# Patient Record
Sex: MALE | Race: WHITE | NOT HISPANIC OR LATINO | Employment: OTHER | URBAN - METROPOLITAN AREA
[De-identification: names, ages, dates, MRNs, and addresses within clinical notes are randomized per-mention and may not be internally consistent; named-entity substitution may affect disease eponyms.]

---

## 2019-03-11 ENCOUNTER — HOSPITAL ENCOUNTER (EMERGENCY)
Facility: MEDICAL CENTER | Age: 64
End: 2019-03-11
Payer: OTHER GOVERNMENT

## 2019-03-11 ENCOUNTER — APPOINTMENT (OUTPATIENT)
Dept: RADIOLOGY | Facility: MEDICAL CENTER | Age: 64
End: 2019-03-11
Attending: EMERGENCY MEDICINE
Payer: OTHER GOVERNMENT

## 2019-03-11 VITALS
DIASTOLIC BLOOD PRESSURE: 98 MMHG | RESPIRATION RATE: 18 BRPM | HEIGHT: 71 IN | SYSTOLIC BLOOD PRESSURE: 160 MMHG | BODY MASS INDEX: 36.17 KG/M2 | HEART RATE: 99 BPM | TEMPERATURE: 99.1 F | OXYGEN SATURATION: 94 % | WEIGHT: 258.38 LBS

## 2019-03-11 PROCEDURE — 71045 X-RAY EXAM CHEST 1 VIEW: CPT

## 2019-03-11 PROCEDURE — 302449 STATCHG TRIAGE ONLY (STATISTIC)

## 2019-03-12 ENCOUNTER — APPOINTMENT (OUTPATIENT)
Dept: RADIOLOGY | Facility: MEDICAL CENTER | Age: 64
DRG: 871 | End: 2019-03-12
Attending: EMERGENCY MEDICINE
Payer: MEDICARE

## 2019-03-12 ENCOUNTER — HOSPITAL ENCOUNTER (INPATIENT)
Facility: MEDICAL CENTER | Age: 64
LOS: 1 days | DRG: 871 | End: 2019-03-13
Attending: EMERGENCY MEDICINE | Admitting: INTERNAL MEDICINE
Payer: MEDICARE

## 2019-03-12 DIAGNOSIS — R09.02 HYPOXIA: ICD-10-CM

## 2019-03-12 DIAGNOSIS — G89.29 OTHER CHRONIC PAIN: ICD-10-CM

## 2019-03-12 DIAGNOSIS — F17.200 TOBACCO DEPENDENCE: ICD-10-CM

## 2019-03-12 DIAGNOSIS — J18.9 PNEUMONIA OF BOTH LOWER LOBES DUE TO INFECTIOUS ORGANISM: ICD-10-CM

## 2019-03-12 PROBLEM — I10 HTN (HYPERTENSION): Status: ACTIVE | Noted: 2019-03-12

## 2019-03-12 PROBLEM — J96.01 ACUTE RESPIRATORY FAILURE WITH HYPOXIA (HCC): Status: ACTIVE | Noted: 2019-03-12

## 2019-03-12 PROBLEM — A41.9 SEVERE SEPSIS (HCC): Status: ACTIVE | Noted: 2019-03-12

## 2019-03-12 PROBLEM — Z72.0 TOBACCO USE: Status: ACTIVE | Noted: 2019-03-12

## 2019-03-12 PROBLEM — R65.20 SEVERE SEPSIS (HCC): Status: ACTIVE | Noted: 2019-03-12

## 2019-03-12 LAB
ALBUMIN SERPL BCP-MCNC: 3.8 G/DL (ref 3.2–4.9)
ALBUMIN/GLOB SERPL: 1 G/DL
ALP SERPL-CCNC: 88 U/L (ref 30–99)
ALT SERPL-CCNC: 36 U/L (ref 2–50)
ANION GAP SERPL CALC-SCNC: 9 MMOL/L (ref 0–11.9)
AST SERPL-CCNC: 21 U/L (ref 12–45)
BASOPHILS # BLD AUTO: 0.6 % (ref 0–1.8)
BASOPHILS # BLD: 0.06 K/UL (ref 0–0.12)
BILIRUB SERPL-MCNC: 0.9 MG/DL (ref 0.1–1.5)
BNP SERPL-MCNC: 31 PG/ML (ref 0–100)
BUN SERPL-MCNC: 19 MG/DL (ref 8–22)
CALCIUM SERPL-MCNC: 9.2 MG/DL (ref 8.4–10.2)
CHLORIDE SERPL-SCNC: 104 MMOL/L (ref 96–112)
CO2 SERPL-SCNC: 24 MMOL/L (ref 20–33)
CREAT SERPL-MCNC: 1.08 MG/DL (ref 0.5–1.4)
EKG IMPRESSION: NORMAL
EOSINOPHIL # BLD AUTO: 0.15 K/UL (ref 0–0.51)
EOSINOPHIL NFR BLD: 1.5 % (ref 0–6.9)
ERYTHROCYTE [DISTWIDTH] IN BLOOD BY AUTOMATED COUNT: 42.6 FL (ref 35.9–50)
FLUAV RNA SPEC QL NAA+PROBE: NEGATIVE
FLUBV RNA SPEC QL NAA+PROBE: NEGATIVE
GLOBULIN SER CALC-MCNC: 3.8 G/DL (ref 1.9–3.5)
GLUCOSE SERPL-MCNC: 120 MG/DL (ref 65–99)
HCT VFR BLD AUTO: 45.4 % (ref 42–52)
HGB BLD-MCNC: 15.7 G/DL (ref 14–18)
IMM GRANULOCYTES # BLD AUTO: 0.04 K/UL (ref 0–0.11)
IMM GRANULOCYTES NFR BLD AUTO: 0.4 % (ref 0–0.9)
LACTATE BLD-SCNC: 1.4 MMOL/L (ref 0.5–2)
LACTATE BLD-SCNC: 1.7 MMOL/L (ref 0.5–2)
LACTATE BLD-SCNC: 1.8 MMOL/L (ref 0.5–2)
LACTATE BLD-SCNC: 2 MMOL/L (ref 0.5–2)
LYMPHOCYTES # BLD AUTO: 1.09 K/UL (ref 1–4.8)
LYMPHOCYTES NFR BLD: 10.8 % (ref 22–41)
MAGNESIUM SERPL-MCNC: 1.7 MG/DL (ref 1.5–2.5)
MCH RBC QN AUTO: 31.5 PG (ref 27–33)
MCHC RBC AUTO-ENTMCNC: 34.6 G/DL (ref 33.7–35.3)
MCV RBC AUTO: 91 FL (ref 81.4–97.8)
MONOCYTES # BLD AUTO: 0.58 K/UL (ref 0–0.85)
MONOCYTES NFR BLD AUTO: 5.7 % (ref 0–13.4)
NEUTROPHILS # BLD AUTO: 8.19 K/UL (ref 1.82–7.42)
NEUTROPHILS NFR BLD: 81 % (ref 44–72)
NRBC # BLD AUTO: 0 K/UL
NRBC BLD-RTO: 0 /100 WBC
PLATELET # BLD AUTO: 227 K/UL (ref 164–446)
PMV BLD AUTO: 9.3 FL (ref 9–12.9)
POTASSIUM SERPL-SCNC: 4.3 MMOL/L (ref 3.6–5.5)
PROCALCITONIN SERPL-MCNC: 0.06 NG/ML
PROT SERPL-MCNC: 7.6 G/DL (ref 6–8.2)
RBC # BLD AUTO: 4.99 M/UL (ref 4.7–6.1)
SODIUM SERPL-SCNC: 137 MMOL/L (ref 135–145)
TROPONIN I SERPL-MCNC: <0.02 NG/ML (ref 0–0.04)
WBC # BLD AUTO: 10.1 K/UL (ref 4.8–10.8)

## 2019-03-12 PROCEDURE — 700102 HCHG RX REV CODE 250 W/ 637 OVERRIDE(OP): Performed by: INTERNAL MEDICINE

## 2019-03-12 PROCEDURE — 71275 CT ANGIOGRAPHY CHEST: CPT

## 2019-03-12 PROCEDURE — 99285 EMERGENCY DEPT VISIT HI MDM: CPT

## 2019-03-12 PROCEDURE — A9270 NON-COVERED ITEM OR SERVICE: HCPCS | Performed by: EMERGENCY MEDICINE

## 2019-03-12 PROCEDURE — 96365 THER/PROPH/DIAG IV INF INIT: CPT

## 2019-03-12 PROCEDURE — 83880 ASSAY OF NATRIURETIC PEPTIDE: CPT

## 2019-03-12 PROCEDURE — 96367 TX/PROPH/DG ADDL SEQ IV INF: CPT

## 2019-03-12 PROCEDURE — 80053 COMPREHEN METABOLIC PANEL: CPT

## 2019-03-12 PROCEDURE — 84145 PROCALCITONIN (PCT): CPT

## 2019-03-12 PROCEDURE — 770020 HCHG ROOM/CARE - TELE (206)

## 2019-03-12 PROCEDURE — 700105 HCHG RX REV CODE 258: Performed by: EMERGENCY MEDICINE

## 2019-03-12 PROCEDURE — 94760 N-INVAS EAR/PLS OXIMETRY 1: CPT

## 2019-03-12 PROCEDURE — 36415 COLL VENOUS BLD VENIPUNCTURE: CPT

## 2019-03-12 PROCEDURE — 83605 ASSAY OF LACTIC ACID: CPT | Mod: 91

## 2019-03-12 PROCEDURE — A9270 NON-COVERED ITEM OR SERVICE: HCPCS | Performed by: INTERNAL MEDICINE

## 2019-03-12 PROCEDURE — 700117 HCHG RX CONTRAST REV CODE 255: Performed by: EMERGENCY MEDICINE

## 2019-03-12 PROCEDURE — 71045 X-RAY EXAM CHEST 1 VIEW: CPT

## 2019-03-12 PROCEDURE — 87502 INFLUENZA DNA AMP PROBE: CPT

## 2019-03-12 PROCEDURE — 700111 HCHG RX REV CODE 636 W/ 250 OVERRIDE (IP): Performed by: EMERGENCY MEDICINE

## 2019-03-12 PROCEDURE — 93005 ELECTROCARDIOGRAM TRACING: CPT | Performed by: EMERGENCY MEDICINE

## 2019-03-12 PROCEDURE — 87040 BLOOD CULTURE FOR BACTERIA: CPT

## 2019-03-12 PROCEDURE — 85025 COMPLETE CBC W/AUTO DIFF WBC: CPT

## 2019-03-12 PROCEDURE — 700111 HCHG RX REV CODE 636 W/ 250 OVERRIDE (IP): Performed by: INTERNAL MEDICINE

## 2019-03-12 PROCEDURE — 700102 HCHG RX REV CODE 250 W/ 637 OVERRIDE(OP): Performed by: EMERGENCY MEDICINE

## 2019-03-12 PROCEDURE — 83735 ASSAY OF MAGNESIUM: CPT

## 2019-03-12 PROCEDURE — 700105 HCHG RX REV CODE 258: Performed by: INTERNAL MEDICINE

## 2019-03-12 PROCEDURE — 99223 1ST HOSP IP/OBS HIGH 75: CPT | Mod: AI | Performed by: INTERNAL MEDICINE

## 2019-03-12 PROCEDURE — 84484 ASSAY OF TROPONIN QUANT: CPT

## 2019-03-12 PROCEDURE — 700101 HCHG RX REV CODE 250: Performed by: INTERNAL MEDICINE

## 2019-03-12 RX ORDER — LOSARTAN POTASSIUM 25 MG/1
50 TABLET ORAL DAILY
Status: DISCONTINUED | OUTPATIENT
Start: 2019-03-13 | End: 2019-03-13 | Stop reason: HOSPADM

## 2019-03-12 RX ORDER — BISACODYL 10 MG
10 SUPPOSITORY, RECTAL RECTAL
Status: DISCONTINUED | OUTPATIENT
Start: 2019-03-12 | End: 2019-03-13 | Stop reason: HOSPADM

## 2019-03-12 RX ORDER — LOSARTAN POTASSIUM 50 MG/1
50 TABLET ORAL DAILY
COMMUNITY

## 2019-03-12 RX ORDER — IBUPROFEN 200 MG
800 TABLET ORAL DAILY
COMMUNITY

## 2019-03-12 RX ORDER — ALBUTEROL SULFATE 90 UG/1
2 AEROSOL, METERED RESPIRATORY (INHALATION) EVERY 6 HOURS PRN
Status: ON HOLD | COMMUNITY
End: 2019-03-13

## 2019-03-12 RX ORDER — ALBUTEROL SULFATE 90 UG/1
2 AEROSOL, METERED RESPIRATORY (INHALATION) EVERY 6 HOURS PRN
Status: DISCONTINUED | OUTPATIENT
Start: 2019-03-12 | End: 2019-03-13 | Stop reason: HOSPADM

## 2019-03-12 RX ORDER — HYDROCODONE BITARTRATE AND ACETAMINOPHEN 10; 325 MG/1; MG/1
1 TABLET ORAL ONCE
Status: COMPLETED | OUTPATIENT
Start: 2019-03-12 | End: 2019-03-12

## 2019-03-12 RX ORDER — SODIUM CHLORIDE 9 MG/ML
500 INJECTION, SOLUTION INTRAVENOUS
Status: DISCONTINUED | OUTPATIENT
Start: 2019-03-12 | End: 2019-03-13 | Stop reason: HOSPADM

## 2019-03-12 RX ORDER — GUAIFENESIN 600 MG/1
600 TABLET, EXTENDED RELEASE ORAL EVERY 12 HOURS
Status: DISCONTINUED | OUTPATIENT
Start: 2019-03-12 | End: 2019-03-13 | Stop reason: HOSPADM

## 2019-03-12 RX ORDER — AZITHROMYCIN 250 MG/1
250 TABLET, FILM COATED ORAL EVERY 24 HOURS
Status: DISCONTINUED | OUTPATIENT
Start: 2019-03-13 | End: 2019-03-13 | Stop reason: HOSPADM

## 2019-03-12 RX ORDER — AZITHROMYCIN 250 MG/1
500 TABLET, FILM COATED ORAL ONCE
Status: DISCONTINUED | OUTPATIENT
Start: 2019-03-12 | End: 2019-03-12

## 2019-03-12 RX ORDER — ACETAMINOPHEN 325 MG/1
650 TABLET ORAL EVERY 6 HOURS PRN
Status: DISCONTINUED | OUTPATIENT
Start: 2019-03-12 | End: 2019-03-13 | Stop reason: HOSPADM

## 2019-03-12 RX ORDER — IPRATROPIUM BROMIDE AND ALBUTEROL SULFATE 2.5; .5 MG/3ML; MG/3ML
3 SOLUTION RESPIRATORY (INHALATION)
Status: DISCONTINUED | OUTPATIENT
Start: 2019-03-12 | End: 2019-03-13 | Stop reason: HOSPADM

## 2019-03-12 RX ORDER — SODIUM CHLORIDE 9 MG/ML
INJECTION, SOLUTION INTRAVENOUS CONTINUOUS
Status: DISCONTINUED | OUTPATIENT
Start: 2019-03-12 | End: 2019-03-13 | Stop reason: HOSPADM

## 2019-03-12 RX ORDER — ONDANSETRON 2 MG/ML
4 INJECTION INTRAMUSCULAR; INTRAVENOUS EVERY 4 HOURS PRN
Status: DISCONTINUED | OUTPATIENT
Start: 2019-03-12 | End: 2019-03-13 | Stop reason: HOSPADM

## 2019-03-12 RX ORDER — ERGOCALCIFEROL 1.25 MG/1
50000 CAPSULE ORAL
Status: DISCONTINUED | OUTPATIENT
Start: 2019-03-12 | End: 2019-03-13 | Stop reason: HOSPADM

## 2019-03-12 RX ORDER — ERGOCALCIFEROL 1.25 MG/1
50000 CAPSULE ORAL
COMMUNITY
End: 2019-03-15

## 2019-03-12 RX ORDER — IBUPROFEN 600 MG/1
TABLET ORAL
Status: COMPLETED
Start: 2019-03-12 | End: 2019-03-12

## 2019-03-12 RX ORDER — AMOXICILLIN 250 MG
2 CAPSULE ORAL 2 TIMES DAILY
Status: DISCONTINUED | OUTPATIENT
Start: 2019-03-12 | End: 2019-03-13 | Stop reason: HOSPADM

## 2019-03-12 RX ORDER — IBUPROFEN 200 MG
TABLET ORAL
Status: COMPLETED
Start: 2019-03-12 | End: 2019-03-12

## 2019-03-12 RX ORDER — HYDROCODONE BITARTRATE AND ACETAMINOPHEN 10; 325 MG/1; MG/1
1 TABLET ORAL 4 TIMES DAILY
Status: DISCONTINUED | OUTPATIENT
Start: 2019-03-12 | End: 2019-03-13 | Stop reason: HOSPADM

## 2019-03-12 RX ORDER — ASPIRIN 325 MG
325 TABLET ORAL DAILY
COMMUNITY

## 2019-03-12 RX ORDER — SODIUM CHLORIDE 9 MG/ML
1000 INJECTION, SOLUTION INTRAVENOUS ONCE
Status: COMPLETED | OUTPATIENT
Start: 2019-03-12 | End: 2019-03-12

## 2019-03-12 RX ORDER — ONDANSETRON 4 MG/1
4 TABLET, ORALLY DISINTEGRATING ORAL EVERY 4 HOURS PRN
Status: DISCONTINUED | OUTPATIENT
Start: 2019-03-12 | End: 2019-03-13 | Stop reason: HOSPADM

## 2019-03-12 RX ORDER — ASPIRIN 325 MG
325 TABLET ORAL DAILY
Status: DISCONTINUED | OUTPATIENT
Start: 2019-03-13 | End: 2019-03-13 | Stop reason: HOSPADM

## 2019-03-12 RX ORDER — POLYETHYLENE GLYCOL 3350 17 G/17G
1 POWDER, FOR SOLUTION ORAL
Status: DISCONTINUED | OUTPATIENT
Start: 2019-03-12 | End: 2019-03-13 | Stop reason: HOSPADM

## 2019-03-12 RX ADMIN — AMPICILLIN SODIUM AND SULBACTAM SODIUM 3 G: 2; 1 INJECTION, POWDER, FOR SOLUTION INTRAMUSCULAR; INTRAVENOUS at 16:58

## 2019-03-12 RX ADMIN — IOHEXOL 75 ML: 350 INJECTION, SOLUTION INTRAVENOUS at 08:40

## 2019-03-12 RX ADMIN — HYDROCODONE BITARTRATE AND ACETAMINOPHEN 1 TABLET: 10; 325 TABLET ORAL at 21:14

## 2019-03-12 RX ADMIN — AMPICILLIN SODIUM AND SULBACTAM SODIUM 3 G: 2; 1 INJECTION, POWDER, FOR SOLUTION INTRAMUSCULAR; INTRAVENOUS at 10:01

## 2019-03-12 RX ADMIN — AMPICILLIN SODIUM AND SULBACTAM SODIUM 3 G: 2; 1 INJECTION, POWDER, FOR SOLUTION INTRAMUSCULAR; INTRAVENOUS at 23:02

## 2019-03-12 RX ADMIN — AZITHROMYCIN MONOHYDRATE 500 MG: 500 INJECTION, POWDER, LYOPHILIZED, FOR SOLUTION INTRAVENOUS at 10:41

## 2019-03-12 RX ADMIN — IPRATROPIUM BROMIDE AND ALBUTEROL SULFATE 3 ML: .5; 3 SOLUTION RESPIRATORY (INHALATION) at 21:43

## 2019-03-12 RX ADMIN — SODIUM CHLORIDE 1000 ML: 9 INJECTION, SOLUTION INTRAVENOUS at 10:00

## 2019-03-12 RX ADMIN — ALBUTEROL SULFATE 2 PUFF: 90 AEROSOL, METERED RESPIRATORY (INHALATION) at 21:13

## 2019-03-12 RX ADMIN — HYDROCODONE BITARTRATE AND ACETAMINOPHEN 1 TABLET: 10; 325 TABLET ORAL at 16:56

## 2019-03-12 RX ADMIN — IBUPROFEN 800 MG: 600 TABLET ORAL at 10:10

## 2019-03-12 RX ADMIN — HYDROCODONE BITARTRATE AND ACETAMINOPHEN 1 TABLET: 10; 325 TABLET ORAL at 09:59

## 2019-03-12 RX ADMIN — ENOXAPARIN SODIUM 40 MG: 100 INJECTION SUBCUTANEOUS at 14:39

## 2019-03-12 RX ADMIN — SODIUM CHLORIDE: 9 INJECTION, SOLUTION INTRAVENOUS at 12:28

## 2019-03-12 RX ADMIN — SODIUM CHLORIDE: 9 INJECTION, SOLUTION INTRAVENOUS at 14:40

## 2019-03-12 ASSESSMENT — PATIENT HEALTH QUESTIONNAIRE - PHQ9
2. FEELING DOWN, DEPRESSED, IRRITABLE, OR HOPELESS: NOT AT ALL
SUM OF ALL RESPONSES TO PHQ9 QUESTIONS 1 AND 2: 0
2. FEELING DOWN, DEPRESSED, IRRITABLE, OR HOPELESS: NOT AT ALL
SUM OF ALL RESPONSES TO PHQ9 QUESTIONS 1 AND 2: 0
1. LITTLE INTEREST OR PLEASURE IN DOING THINGS: NOT AT ALL
1. LITTLE INTEREST OR PLEASURE IN DOING THINGS: NOT AT ALL

## 2019-03-12 ASSESSMENT — LIFESTYLE VARIABLES
AVERAGE NUMBER OF DAYS PER WEEK YOU HAVE A DRINK CONTAINING ALCOHOL: 1
EVER_SMOKED: YES
TOTAL SCORE: 0
HAVE PEOPLE ANNOYED YOU BY CRITICIZING YOUR DRINKING: NO
EVER HAD A DRINK FIRST THING IN THE MORNING TO STEADY YOUR NERVES TO GET RID OF A HANGOVER: NO
TOTAL SCORE: 0
HAVE YOU EVER FELT YOU SHOULD CUT DOWN ON YOUR DRINKING: NO
ON A TYPICAL DAY WHEN YOU DRINK ALCOHOL HOW MANY DRINKS DO YOU HAVE: 0
EVER FELT BAD OR GUILTY ABOUT YOUR DRINKING: NO
TOTAL SCORE: 0
HOW MANY TIMES IN THE PAST YEAR HAVE YOU HAD 5 OR MORE DRINKS IN A DAY: 0
CONSUMPTION TOTAL: NEGATIVE
ALCOHOL_USE: YES

## 2019-03-12 ASSESSMENT — ENCOUNTER SYMPTOMS
FOCAL WEAKNESS: 0
CHILLS: 0
HEADACHES: 0
VOMITING: 0
BACK PAIN: 0
PALPITATIONS: 0
HALLUCINATIONS: 0
COUGH: 1
WEAKNESS: 0
HEARTBURN: 0
BLOOD IN STOOL: 0
DEPRESSION: 0
DIARRHEA: 0
PND: 0
SHORTNESS OF BREATH: 1
ABDOMINAL PAIN: 0
DIZZINESS: 0
SORE THROAT: 0
FEVER: 1
MYALGIAS: 0
NAUSEA: 0

## 2019-03-12 ASSESSMENT — COGNITIVE AND FUNCTIONAL STATUS - GENERAL
SUGGESTED CMS G CODE MODIFIER MOBILITY: CH
SUGGESTED CMS G CODE MODIFIER DAILY ACTIVITY: CH
DAILY ACTIVITIY SCORE: 24
MOBILITY SCORE: 24

## 2019-03-12 NOTE — ED PROVIDER NOTES
ED Provider Note    CHIEF COMPLAINT  Chief Complaint   Patient presents with   • Shortness of Breath     pt has had chest cold since thursday night, pt increasingly SOB since last night       HPI  Heri Delgado is a 64 y.o. male who presents to the emergency department through triage for cough and shortness of breath.  Patient with URI symptoms for 5 days, nasal congestion, cough although with increased work of breathing since last night.  Patient states he thought he was feeling better yesterday morning, he was able to walk almost a mile without difficulty and had significantly increased shortness of breath and hacking cough last night.  Patient came to this facility for evaluation, chest x-ray was done but not interpreted or explained to him before he left due to weight.  Patient states he did not sleep last night, cannot lay flat and had persistent shortness of breath which prompted reevaluation.  Describes low-grade fever.  Nasal congestion.  Chest pain with cough only.  No palpitations or syncope.  No abdominal pain or back pain.  No diaphoresis.    History of hypertension, dyslipidemia.  Tobacco dependence.  Denies history for asthma, COPD, emphysema.  Patient has had pneumonia previously, although did not require hospitalization.    REVIEW OF SYSTEMS  See HPI for further details. All other systems are negative.    PAST MEDICAL HISTORY   has a past medical history of Hypercholesterolemia and Hypertension.    SOCIAL HISTORY  Social History     Social History Main Topics   • Smoking status: Current Some Day Smoker   • Smokeless tobacco: Never Used   • Alcohol use No   • Drug use: No   • Sexual activity: Not on file       SURGICAL HISTORY   has a past surgical history that includes other orthopedic surgery.    CURRENT MEDICATIONS  Home Medications     Reviewed by Jomar Bianchi (Pharmacy Tech) on 03/12/19 at 0820  Med List Status: Complete   Medication Last Dose Status   albuterol 108 (90 Base)  "MCG/ACT Aero Soln inhalation aerosol 3/11/2019 Active   aspirin (ASA) 325 MG Tab 3/12/2019 Active   Coenzyme Q10 (CO Q 10 PO) 3/11/2019 Active   hydrocodone/acetaminophen (NORCO)  MG TABS 3/12/2019 Active   ibuprofen (MOTRIN) 200 MG Tab 3/12/2019 Active   KRILL OIL PO 3/11/2019 Active   losartan (COZAAR) 50 MG Tab 3/12/2019 Active   vitamin D, Ergocalciferol, (DRISDOL) 94779 units Cap capsule 3/5/2019 Active                ALLERGIES  No Known Allergies    PHYSICAL EXAM  VITAL SIGNS: /82   Pulse 96   Temp (!) 38.1 °C (100.5 °F) (Oral)   Resp (!) 25   Ht 1.803 m (5' 11\")   Wt 116.3 kg (256 lb 6.3 oz)   SpO2 93%   BMI 35.76 kg/m²   Pulse ox interpretation: I interpret this pulse ox as normal.  Constitutional: Alert in no apparent distress.  Disheveled.  HENT: Normocephalic, atraumatic. Bilateral external ears normal, Nose normal.  Dry mucous membranes.    Eyes: Pupils are equal and reactive, Conjunctiva normal.   Neck: Normal range of motion, Supple.  No meningeal irritation.  Lymphatic: No lymphadenopathy noted.  No cervical or submandibular lymphadenopathy.  Cardiovascular: Mild tachycardia otherwise regular rate and rhythm, no murmurs. Distal pulses intact.  No peripheral edema.  Thorax & Lungs: Diminished bilaterally and diffusely, faint bibasilar rales.  No expiratory wheezes.  Tachypnea and dry cough without other increased work of breathing, clip speech retractions per  Abdomen: Soft, non-distended, non-tender to palpation.  Skin: Warm, Dry  Musculoskeletal: Good range of motion in all major joints.   Neurologic: Alert and oriented x4.  Moves for extremity spontaneously.  Psychiatric: Affect normal, Judgment normal, Mood normal.       DIAGNOSTIC STUDIES / PROCEDURES    LABS  Results for orders placed or performed during the hospital encounter of 03/12/19   CBC w/ Differential   Result Value Ref Range    WBC 10.1 4.8 - 10.8 K/uL    RBC 4.99 4.70 - 6.10 M/uL    Hemoglobin 15.7 14.0 - 18.0 g/dL "    Hematocrit 45.4 42.0 - 52.0 %    MCV 91.0 81.4 - 97.8 fL    MCH 31.5 27.0 - 33.0 pg    MCHC 34.6 33.7 - 35.3 g/dL    RDW 42.6 35.9 - 50.0 fL    Platelet Count 227 164 - 446 K/uL    MPV 9.3 9.0 - 12.9 fL    Neutrophils-Polys 81.00 (H) 44.00 - 72.00 %    Lymphocytes 10.80 (L) 22.00 - 41.00 %    Monocytes 5.70 0.00 - 13.40 %    Eosinophils 1.50 0.00 - 6.90 %    Basophils 0.60 0.00 - 1.80 %    Immature Granulocytes 0.40 0.00 - 0.90 %    Nucleated RBC 0.00 /100 WBC    Neutrophils (Absolute) 8.19 (H) 1.82 - 7.42 K/uL    Lymphs (Absolute) 1.09 1.00 - 4.80 K/uL    Monos (Absolute) 0.58 0.00 - 0.85 K/uL    Eos (Absolute) 0.15 0.00 - 0.51 K/uL    Baso (Absolute) 0.06 0.00 - 0.12 K/uL    Immature Granulocytes (abs) 0.04 0.00 - 0.11 K/uL    NRBC (Absolute) 0.00 K/uL   Complete Metabolic Panel (CMP)   Result Value Ref Range    Sodium 137 135 - 145 mmol/L    Potassium 4.3 3.6 - 5.5 mmol/L    Chloride 104 96 - 112 mmol/L    Co2 24 20 - 33 mmol/L    Anion Gap 9.0 0.0 - 11.9    Glucose 120 (H) 65 - 99 mg/dL    Bun 19 8 - 22 mg/dL    Creatinine 1.08 0.50 - 1.40 mg/dL    Calcium 9.2 8.4 - 10.2 mg/dL    AST(SGOT) 21 12 - 45 U/L    ALT(SGPT) 36 2 - 50 U/L    Alkaline Phosphatase 88 30 - 99 U/L    Total Bilirubin 0.9 0.1 - 1.5 mg/dL    Albumin 3.8 3.2 - 4.9 g/dL    Total Protein 7.6 6.0 - 8.2 g/dL    Globulin 3.8 (H) 1.9 - 3.5 g/dL    A-G Ratio 1.0 g/dL   Btype Natriuretic Peptide   Result Value Ref Range    B Natriuretic Peptide 31 0 - 100 pg/mL   Troponin STAT   Result Value Ref Range    Troponin I <0.02 0.00 - 0.04 ng/mL   Magnesium   Result Value Ref Range    Magnesium 1.7 1.5 - 2.5 mg/dL   LACTIC ACID   Result Value Ref Range    Lactic Acid 2.0 0.5 - 2.0 mmol/L   Influenza By PCR, A/B   Result Value Ref Range    Influenza virus A RNA Negative Negative    Influenza virus B, PCR Negative Negative   ESTIMATED GFR   Result Value Ref Range    GFR If African American >60 >60 mL/min/1.73 m 2    GFR If Non  >60 >60  mL/min/1.73 m 2   EKG   Result Value Ref Range    Report       [facility] Emergency Dept.    Test Date:  2019  Pt Name:    OLGA AZAR            Department: [facility]  MRN:        [id number]                      Room:       -ROOM 6  Gender:     Male                         Technician: ADEBAYO  :        1955                   Requested By:RAJAN BARON  Order #:    [id number]                    Reading MD: RAJAN BARON DO    Measurements  Intervals                                Axis  Rate:       97                           P:          24  PA:         164                          QRS:        -6  QRSD:       88                           T:          28  QT:         360  QTc:        458    Interpretive Statements  SINUS RHYTHM  LATE PRECORDIAL R/S TRANSITION  S1Q3T3  No previous ECG available for comparison    Electronically Signed On 3- 9:24:19 PDT by RAJAN BARON DO         RADIOLOGY  CT-CTA CHEST PULMONARY ARTERY W/ RECONS   Final Result      No CT evidence of pulmonary thromboembolism      Bronchopneumonia favored over aspiration or edema      Mild right hilar adenopathy most likely is reactive      Small hiatal hernia      Hepatic steatosis and left lobe simple cyst      DX-CHEST-PORTABLE (1 VIEW)   Final Result      No acute cardiopulmonary abnormality.          COURSE & MEDICAL DECISION MAKING  Nursing notes and vital signs were reviewed. (See chart for details)  The patients records were reviewed, history was obtained from the patient;     Evaluation for URI symptoms, cough, shortness of breath and hypoxia most consistent with developing pneumonia.  However, with initial presentation and evaluation, bedside x-ray cannot exclude edema or new onset CHF.  X-rays were quite unrevealing although history provides evidence for pneumonia.  CT chest to exclude pulmonary embolism but does describe more significant bilateral consolidation consistent with  bronchopneumonia.  Patient was hypoxic on triage resting comfortably with 3 L supplemental oxygen by nasal cannula with saturations in the low to mid 90s.  No leukocytosis despite mild left shift, no bandemia.  No lactic acidosis.  No electrode derangement.  Troponin and BNP unremarkable.  Influenza negative.  IV fluid bolus for clinical dehydration, mild tachycardia and pneumonia with some improvement in the heart rate.  Antibiotics per protocol for community acquired pneumonia, Augmentin and azithromycin.  No persistent respiratory distress, no indication for positive pressure mechanical ventilation.  No clinical evidence for sepsis.  No wheezing despite chronic tobacco dependence, no indication for albuterol at this time.  Patient is aware of the findings and agreeable to the disposition plan is he will be admitted to the hospital for further evaluation and treatment.    9:23 AM Hospitalist paged.    9:40 AM Dr. Lloyd is aware of the patient agreeable to admission.    FINAL IMPRESSION  (J18.1) Pneumonia of both lower lobes due to infectious organism (HCC)  (R09.02) Hypoxia  (F17.200) Tobacco dependence  (G89.29) Other chronic pain      Electronically signed by: Shannon Arevalo, 3/12/2019 9:23 AM      This dictation was created using voice recognition software. The accuracy of the dictation is limited to the abilities of the software. I expect there may be some errors of grammar and possibly content. The nursing notes were reviewed and certain aspects of this information were incorporated into this note.

## 2019-03-12 NOTE — ED NOTES
Med rec updated and complete  Allergies reviewed  Interviewed pt with wife at bedside with permission from pt.  Pt reports that he takes ADVIL 200MG 4 tablets every day and ASPIRIN 325MG 1 tablet every day.  Pt reports no antibiotics in the last 30 days.

## 2019-03-12 NOTE — PROGRESS NOTES
Patient approached triage and stated that he was feeling better and that he was going to call his primary to get a prescription of steroids and that he would return if he got worse. Patient was educated on risks of leaving without seeing a ERP and given strict return criteria. He verbalized understanding and left in no distress

## 2019-03-12 NOTE — CARE PLAN
Problem: Safety  Goal: Will remain free from falls  Outcome: PROGRESSING AS EXPECTED  Reinforced fall risk precautions. Non-skid socks on feet, call light in reach. Stand by assist to the bathroom.      Problem: Infection  Goal: Will remain free from infection  Outcome: PROGRESSING AS EXPECTED  Good hand and oral hygiene promoted. Afebrile, WBCs WNL. Standard precaution in place. Perform hand washing. Administer ABX as prescribed.    Problem: Respiratory:  Goal: Respiratory status will improve  Outcome: PROGRESSING AS EXPECTED  Assessed respiratory status. Encouraged deep breathing and coughing. Encouraged and assisted with use of Incentive Spirometer. Administer medications as scheduled. Monitor O2 Sat and administer O2 as needed to keep sats >90% . 2L via NC

## 2019-03-12 NOTE — ED NOTES
Medicated for chest wall/body aches. 1st abx and NS infusing.  Wife at BS and call light in reach.

## 2019-03-12 NOTE — ED TRIAGE NOTES
"Chief Complaint   Patient presents with   • Shortness of Breath     pt has had chest cold since thursday night, pt increasingly SOB since last night     /82   Pulse (!) 101   Temp 37.7 °C (99.9 °F) (Oral)   Ht 1.803 m (5' 11\")   Wt 116.3 kg (256 lb 6.3 oz)   SpO2 88%   BMI 35.76 kg/m²     Pt BIB wife for SOB, pt immediatly placed on 2L NC for hypoxia - pt in the low 80s on RA and blue tinged finger tips present, pt has had a cold for past few days.     Pt was in  ED last night for same complaint but had decided to leave due to long wait times.   "

## 2019-03-12 NOTE — ED TRIAGE NOTES
Patient BiB wife for new onset wheezing and SOB 1 hour ago when he laid flat in bed. Patient has had cold like symptoms for the past week. He denies any resp. Hx.

## 2019-03-12 NOTE — PROGRESS NOTES
Pt arrives to unit from ER to unit via [place], accompanied by son. Pt A&Ox4, No c/o pain. Tele monitor applied, vitals taken. History, allergies, and med rec reviewed and admission profile completed.     Pt oriented to unit and plan of care discussed, including IV fluids, antibiotics, tele monitor. Welcome folder provided and discussed. Communication board filled out. Pt instructed on call light usage and encouraged to call for any questions, needs, or concerns and prior to getting out of bed. Fall precautions in place. Pt agrees with the plan and declines any needs at this time. Bed locked and low and bed alarm on.

## 2019-03-12 NOTE — ED NOTES
Report called to DENA Cabrera.  Aware pt is on the way and that I am available for any questions/concerns.  Preparing for transfer via gurney w/ tele/O2. No changes at this time.

## 2019-03-13 ENCOUNTER — APPOINTMENT (OUTPATIENT)
Dept: ADMISSIONS | Facility: MEDICAL CENTER | Age: 64
End: 2019-03-13
Payer: MEDICARE

## 2019-03-13 ENCOUNTER — PATIENT OUTREACH (OUTPATIENT)
Dept: HEALTH INFORMATION MANAGEMENT | Facility: OTHER | Age: 64
End: 2019-03-13

## 2019-03-13 VITALS
HEART RATE: 70 BPM | RESPIRATION RATE: 18 BRPM | SYSTOLIC BLOOD PRESSURE: 138 MMHG | DIASTOLIC BLOOD PRESSURE: 69 MMHG | OXYGEN SATURATION: 94 % | HEIGHT: 71 IN | TEMPERATURE: 97.2 F | WEIGHT: 260.58 LBS | BODY MASS INDEX: 36.48 KG/M2

## 2019-03-13 LAB
ALBUMIN SERPL BCP-MCNC: 3.4 G/DL (ref 3.2–4.9)
ALBUMIN/GLOB SERPL: 1.1 G/DL
ALP SERPL-CCNC: 71 U/L (ref 30–99)
ALT SERPL-CCNC: 25 U/L (ref 2–50)
ANION GAP SERPL CALC-SCNC: 5 MMOL/L (ref 0–11.9)
AST SERPL-CCNC: 14 U/L (ref 12–45)
BASOPHILS # BLD AUTO: 0.4 % (ref 0–1.8)
BASOPHILS # BLD: 0.03 K/UL (ref 0–0.12)
BILIRUB SERPL-MCNC: 0.7 MG/DL (ref 0.1–1.5)
BUN SERPL-MCNC: 22 MG/DL (ref 8–22)
CALCIUM SERPL-MCNC: 8.1 MG/DL (ref 8.4–10.2)
CHLORIDE SERPL-SCNC: 110 MMOL/L (ref 96–112)
CO2 SERPL-SCNC: 22 MMOL/L (ref 20–33)
CREAT SERPL-MCNC: 0.92 MG/DL (ref 0.5–1.4)
EOSINOPHIL # BLD AUTO: 0.21 K/UL (ref 0–0.51)
EOSINOPHIL NFR BLD: 3 % (ref 0–6.9)
ERYTHROCYTE [DISTWIDTH] IN BLOOD BY AUTOMATED COUNT: 43.9 FL (ref 35.9–50)
GLOBULIN SER CALC-MCNC: 3.1 G/DL (ref 1.9–3.5)
GLUCOSE SERPL-MCNC: 104 MG/DL (ref 65–99)
HCT VFR BLD AUTO: 38.7 % (ref 42–52)
HGB BLD-MCNC: 13.5 G/DL (ref 14–18)
IMM GRANULOCYTES # BLD AUTO: 0.02 K/UL (ref 0–0.11)
IMM GRANULOCYTES NFR BLD AUTO: 0.3 % (ref 0–0.9)
LACTATE BLD-SCNC: 1 MMOL/L (ref 0.5–2)
LYMPHOCYTES # BLD AUTO: 1.91 K/UL (ref 1–4.8)
LYMPHOCYTES NFR BLD: 27.1 % (ref 22–41)
MAGNESIUM SERPL-MCNC: 2 MG/DL (ref 1.5–2.5)
MCH RBC QN AUTO: 32.1 PG (ref 27–33)
MCHC RBC AUTO-ENTMCNC: 34.9 G/DL (ref 33.7–35.3)
MCV RBC AUTO: 91.9 FL (ref 81.4–97.8)
MONOCYTES # BLD AUTO: 0.75 K/UL (ref 0–0.85)
MONOCYTES NFR BLD AUTO: 10.6 % (ref 0–13.4)
NEUTROPHILS # BLD AUTO: 4.14 K/UL (ref 1.82–7.42)
NEUTROPHILS NFR BLD: 58.6 % (ref 44–72)
NRBC # BLD AUTO: 0 K/UL
NRBC BLD-RTO: 0 /100 WBC
PLATELET # BLD AUTO: 193 K/UL (ref 164–446)
PMV BLD AUTO: 9.4 FL (ref 9–12.9)
POTASSIUM SERPL-SCNC: 3.7 MMOL/L (ref 3.6–5.5)
PROT SERPL-MCNC: 6.5 G/DL (ref 6–8.2)
RBC # BLD AUTO: 4.21 M/UL (ref 4.7–6.1)
SODIUM SERPL-SCNC: 137 MMOL/L (ref 135–145)
WBC # BLD AUTO: 7.1 K/UL (ref 4.8–10.8)

## 2019-03-13 PROCEDURE — 700101 HCHG RX REV CODE 250: Performed by: INTERNAL MEDICINE

## 2019-03-13 PROCEDURE — 83735 ASSAY OF MAGNESIUM: CPT

## 2019-03-13 PROCEDURE — 700102 HCHG RX REV CODE 250 W/ 637 OVERRIDE(OP): Performed by: HOSPITALIST

## 2019-03-13 PROCEDURE — 94760 N-INVAS EAR/PLS OXIMETRY 1: CPT

## 2019-03-13 PROCEDURE — A9270 NON-COVERED ITEM OR SERVICE: HCPCS | Performed by: INTERNAL MEDICINE

## 2019-03-13 PROCEDURE — 700102 HCHG RX REV CODE 250 W/ 637 OVERRIDE(OP): Performed by: INTERNAL MEDICINE

## 2019-03-13 PROCEDURE — 700111 HCHG RX REV CODE 636 W/ 250 OVERRIDE (IP): Performed by: INTERNAL MEDICINE

## 2019-03-13 PROCEDURE — 80053 COMPREHEN METABOLIC PANEL: CPT

## 2019-03-13 PROCEDURE — 700105 HCHG RX REV CODE 258: Performed by: INTERNAL MEDICINE

## 2019-03-13 PROCEDURE — 83605 ASSAY OF LACTIC ACID: CPT

## 2019-03-13 PROCEDURE — A9270 NON-COVERED ITEM OR SERVICE: HCPCS | Performed by: HOSPITALIST

## 2019-03-13 PROCEDURE — 99239 HOSP IP/OBS DSCHRG MGMT >30: CPT | Performed by: INTERNAL MEDICINE

## 2019-03-13 PROCEDURE — 85025 COMPLETE CBC W/AUTO DIFF WBC: CPT

## 2019-03-13 RX ORDER — GUAIFENESIN 600 MG/1
600 TABLET, EXTENDED RELEASE ORAL EVERY 12 HOURS
Qty: 28 TAB | Refills: 2 | Status: SHIPPED | OUTPATIENT
Start: 2019-03-13 | End: 2019-03-15

## 2019-03-13 RX ORDER — ALBUTEROL SULFATE 90 UG/1
2 AEROSOL, METERED RESPIRATORY (INHALATION) EVERY 6 HOURS PRN
Qty: 8.5 G | Refills: 1 | Status: SHIPPED | OUTPATIENT
Start: 2019-03-13

## 2019-03-13 RX ORDER — AMOXICILLIN AND CLAVULANATE POTASSIUM 875; 125 MG/1; MG/1
1 TABLET, FILM COATED ORAL 2 TIMES DAILY
Qty: 10 TAB | Refills: 0 | Status: SHIPPED | OUTPATIENT
Start: 2019-03-13 | End: 2019-03-18

## 2019-03-13 RX ORDER — AZITHROMYCIN 250 MG/1
250 TABLET, FILM COATED ORAL DAILY
Qty: 4 TAB | Refills: 0 | Status: SHIPPED | OUTPATIENT
Start: 2019-03-13 | End: 2019-03-17

## 2019-03-13 RX ADMIN — LOSARTAN POTASSIUM 50 MG: 25 TABLET ORAL at 05:00

## 2019-03-13 RX ADMIN — IPRATROPIUM BROMIDE AND ALBUTEROL SULFATE 3 ML: .5; 3 SOLUTION RESPIRATORY (INHALATION) at 07:14

## 2019-03-13 RX ADMIN — HYDROCODONE BITARTRATE AND ACETAMINOPHEN 1 TABLET: 10; 325 TABLET ORAL at 09:09

## 2019-03-13 RX ADMIN — GUAIFENESIN 600 MG: 600 TABLET, EXTENDED RELEASE ORAL at 05:00

## 2019-03-13 RX ADMIN — AMPICILLIN SODIUM AND SULBACTAM SODIUM 3 G: 2; 1 INJECTION, POWDER, FOR SOLUTION INTRAMUSCULAR; INTRAVENOUS at 04:59

## 2019-03-13 RX ADMIN — IBUPROFEN 800 MG: 600 TABLET ORAL at 05:00

## 2019-03-13 RX ADMIN — ASPIRIN 325 MG ORAL TABLET 325 MG: 325 PILL ORAL at 05:00

## 2019-03-13 RX ADMIN — AZITHROMYCIN 250 MG: 250 TABLET, FILM COATED ORAL at 05:00

## 2019-03-13 RX ADMIN — ENOXAPARIN SODIUM 40 MG: 100 INJECTION SUBCUTANEOUS at 05:00

## 2019-03-13 NOTE — PROGRESS NOTES
Report given to Lisseth FERNANDES. POC discussed. Pt resting comfortably in bed. Safety precautions in place.

## 2019-03-13 NOTE — FLOWSHEET NOTE
03/13/19 0714   Interdisciplinary Plan of Care-Goals (Indications)   Bronchodilator Indications Strong Subjective / Objective Improvement   Interdisciplinary Plan of Care-Outcomes    Bronchodilator Outcome Patient at Stable Baseline   Education   Education Yes - Pt. / Family has been Instructed in use of Respiratory Medications and Adverse Reactions   RT Assessment of Delivered Medications   Evaluation of Medication Delivery Daily Yes-- Pt /Family has been Instructed in use of Respiratory Medications and Adverse Reactions   Respiratory WDL   Respiratory (WDL) X   Chest Exam   Work Of Breathing / Effort Mild   Respiration 16   Pulse 69   Breath Sounds   Pre/Post Intervention Post Intervention Assessment   RUL Breath Sounds Transmitted Upper Airway Sound   RML Breath Sounds Diminished   RLL Breath Sounds Diminished   SUSANNE Breath Sounds Transmitted Upper Airway Sound   LLL Breath Sounds Fine Crackles   Secretions   Cough Dry   Oximetry   #Pulse Oximetry (Single Determination) Yes   Oxygen   Pulse Oximetry 93 %   O2 (LPM) 0   O2 Daily Delivery Respiratory  Room Air with O2 Available

## 2019-03-13 NOTE — ASSESSMENT & PLAN NOTE
This is severe sepsis with the following associated acute organ dysfunction(s): acute respiratory failure.   Due to community-acquired pneumonia.  Will place on sepsis protocol and trend lactate.  Will place on IV Unasyn and azithromycin  Sepsis protocol  Follow cultures  Procalcitonin  BNP normal

## 2019-03-13 NOTE — ASSESSMENT & PLAN NOTE
Due to [facility] pneumonia.  He has a history of tobacco though states he has not been smoking as heavily for the last 8 months.  -Continue antibiotics  -As needed duo nebs

## 2019-03-13 NOTE — FLOWSHEET NOTE
03/12/19 2144   Events/Summary/Plan   Events/Summary/Plan SVN PRN   Interdisciplinary Plan of Care-Goals (Indications)   Bronchodilator Indications Strong Subjective / Objective Improvement   Interdisciplinary Plan of Care-Outcomes    Bronchodilator Outcome Patient at Stable Baseline   Education   Education Yes - Pt. / Family has been Instructed in use of Respiratory Equipment;Yes - Pt. / Family has been Instructed in use of Respiratory Medications and Adverse Reactions   RT Assessment of Delivered Medications   Evaluation of Medication Delivery Daily Yes-- Pt /Family has been Instructed in use of Respiratory Medications and Adverse Reactions   Respiratory WDL   Respiratory (WDL) X   Chest Exam   Respiration 16   Pulse 76   Breath Sounds   Pre/Post Intervention Post Intervention Assessment   RUL Breath Sounds Transmitted Upper Airway Sound;Expiratory Wheezes   RML Breath Sounds Diminished   RLL Breath Sounds Diminished;Crackles   SUSANNE Breath Sounds Transmitted Upper Airway Sound;Expiratory Wheezes   LLL Breath Sounds Fine Crackles;Diminished   Oximetry   #Pulse Oximetry (Single Determination) Yes   Oxygen   Home O2 Use Prior To Admission? No   Pulse Oximetry 98 %   O2 (LPM) 2   O2 Daily Delivery Respiratory  Nasal Cannula

## 2019-03-13 NOTE — PROGRESS NOTES
Telemetry Shift Summary    Rhythm SR  HR Range 60-70s  Ectopy Occ PVCs  Measurements .16/.10/.36        Normal Values  Rhythm SR  HR Range    Measurements 0.12-0.20 / 0.06-0.10  / 0.30-0.52

## 2019-03-13 NOTE — PROGRESS NOTES
"RT suggested a mucolytic for pt. Dr. Kumar notified. New order received. Pt then refused mucinex stating that his congestion feels like it is \"breaking up\" from cough.  "

## 2019-03-13 NOTE — H&P
Hospital Medicine History & Physical Note    Date of Service  3/12/2019    Primary Care Physician  Arsenio Sarkar M.D.    Consultants  None    Code Status  Full    Chief Complaint  Cough, Fever    History of Presenting Illness  64 y.o. male with a history of tobacco use, hypertension who presented 3/12/2019 with Fever, cough and shortness of breath.    He first began feeling ill with cough about a week ago.  His symptoms persisted throughout the week and he slowly developed shortness of breath was worse with exertion.  He noted that his breathing also seemed to worsen while lying flat on his back and improved when sitting up at a 45 degree angle.  He denies any chest pain or leg swelling.  He has had a slightly productive cough with green to yellow sputum.  Over the last 1-2 days he has developed fever.  Last night, his breathing seemed much worse and he was unable to get comfortable except when sitting at 45 degrees or more.  This brought him to the ER for evaluation.  He has had similar symptoms one time before in 2014 when he also had pneumonia however he was not hospitalized at this time.  He used to smoke heavily and cut down 8 months ago, last cigarette was 2 weeks ago.    ER course: Noted to be febrile and tachycardic.  Chest x-ray did not show an obvious pneumonia however CT scan confirmed bronchopneumonia.    Review of Systems  Review of Systems   Constitutional: Positive for fever and malaise/fatigue. Negative for chills.   HENT: Negative for sore throat.    Respiratory: Positive for cough and shortness of breath.    Cardiovascular: Negative for chest pain, palpitations, leg swelling and PND.   Gastrointestinal: Negative for abdominal pain, blood in stool, diarrhea, heartburn, nausea and vomiting.   Genitourinary: Negative for dysuria and frequency.   Musculoskeletal: Negative for back pain and myalgias.   Neurological: Negative for dizziness, focal weakness, weakness and headaches.    Psychiatric/Behavioral: Negative for depression and hallucinations.   All other systems reviewed and are negative.      Past Medical History   has a past medical history of Hypercholesterolemia and Hypertension.    Surgical History   has a past surgical history that includes other orthopedic surgery.     Family History  denies any family history of heart attack or stroke.    Social History   reports that he has been smoking.  He has never used smokeless tobacco. He reports that he does not drink alcohol or use drugs.    Allergies  No Known Allergies    Medications  Prior to Admission Medications   Prescriptions Last Dose Informant Patient Reported? Taking?   Coenzyme Q10 (CO Q 10 PO) 3/11/2019 at 2000 Patient Yes Yes   Sig: Take 1 Cap by mouth every evening.   KRILL OIL PO 3/11/2019 at 2000 Patient Yes Yes   Sig: Take 1 Cap by mouth every evening.   albuterol 108 (90 Base) MCG/ACT Aero Soln inhalation aerosol 3/11/2019 at 2000 Patient Yes Yes   Sig: Inhale 2 Puffs by mouth every 6 hours as needed for Shortness of Breath.   aspirin (ASA) 325 MG Tab 3/12/2019 at 0000 Patient Yes Yes   Sig: Take 325 mg by mouth every day.   hydrocodone/acetaminophen (NORCO)  MG TABS 3/12/2019 at 0400 Patient Yes No   Sig: Take 1 Tab by mouth 4 times a day.   ibuprofen (MOTRIN) 200 MG Tab 3/12/2019 at 0000 Patient Yes Yes   Sig: Take 800 mg by mouth every day.   losartan (COZAAR) 50 MG Tab 3/12/2019 at 0000 Patient Yes Yes   Sig: Take 50 mg by mouth every day.   vitamin D, Ergocalciferol, (DRISDOL) 12207 units Cap capsule 3/11/2019 at PM Patient Yes Yes   Sig: Take 50,000 Units by mouth every 7 days. On Tue      Facility-Administered Medications: None       Physical Exam  Temp:  [36.4 °C (97.5 °F)-38.1 °C (100.5 °F)] 36.4 °C (97.5 °F)  Pulse:  [] 65  Resp:  [16-26] 16  BP: (127-144)/(60-82) 127/60  SpO2:  [88 %-94 %] 92 %    Physical Exam   Constitutional: He is oriented to person, place, and time. He appears  well-developed and well-nourished. No distress.   HENT:   Head: Normocephalic.   Mouth/Throat: No oropharyngeal exudate.   Eyes: Pupils are equal, round, and reactive to light. Conjunctivae are normal.   Neck: Normal range of motion. No tracheal deviation present.   Cardiovascular: Normal rate and regular rhythm.    No murmur heard.  Pulmonary/Chest: Effort normal. No respiratory distress. He has wheezes. He has rales. He exhibits no tenderness.   Abdominal: Soft. He exhibits no distension. There is no tenderness. There is no guarding.   Central obesity   Musculoskeletal: Normal range of motion. He exhibits no edema or tenderness.   Lymphadenopathy:     He has no cervical adenopathy.   Neurological: He is alert and oriented to person, place, and time. No cranial nerve deficit.   Skin: Skin is warm and dry. No rash noted.   Psychiatric: He has a normal mood and affect. His behavior is normal.   Nursing note and vitals reviewed.      Laboratory:  Recent Labs      03/12/19   0715   WBC  10.1   RBC  4.99   HEMOGLOBIN  15.7   HEMATOCRIT  45.4   MCV  91.0   MCH  31.5   MCHC  34.6   RDW  42.6   PLATELETCT  227   MPV  9.3     Recent Labs      03/12/19   0715   SODIUM  137   POTASSIUM  4.3   CHLORIDE  104   CO2  24   GLUCOSE  120*   BUN  19   CREATININE  1.08   CALCIUM  9.2     Recent Labs      03/12/19   0715   ALTSGPT  36   ASTSGOT  21   ALKPHOSPHAT  88   TBILIRUBIN  0.9   GLUCOSE  120*         Recent Labs      03/12/19   0715   BNPBTYPENAT  31         Recent Labs      03/12/19   0715   TROPONINI  <0.02       Urinalysis:    No results found     Imaging:  CT-CTA CHEST PULMONARY ARTERY W/ RECONS   Final Result      No CT evidence of pulmonary thromboembolism      Bronchopneumonia favored over aspiration or edema      Mild right hilar adenopathy most likely is reactive      Small hiatal hernia      Hepatic steatosis and left lobe simple cyst      DX-CHEST-PORTABLE (1 VIEW)   Final Result      No acute cardiopulmonary  abnormality.            Assessment/Plan:  I anticipate this patient will require at least two midnights for appropriate medical management, necessitating inpatient admission.    * Bilateral pneumonia   Assessment & Plan    He has no history of aspiration or recent hospitalization.  Continue Unasyn and azithromycin     Tobacco use   Assessment & Plan    -counseled for 3 minutes on tobacco cessation [id number]       Pain, chronic   Assessment & Plan    Continue Norco  Okay for Tylenol and Motrin as needed     HTN (hypertension)   Assessment & Plan    Controlled, continue Cozaar     Acute respiratory failure with hypoxia (HCC)   Assessment & Plan    Due to [facility] pneumonia.  He has a history of tobacco though states he has not been smoking as heavily for the last 8 months.  -Continue antibiotics  -As needed duo nebs     Severe sepsis (HCC)   Assessment & Plan    This is severe sepsis with the following associated acute organ dysfunction(s): acute respiratory failure.   Due to community-acquired pneumonia.  Will place on sepsis protocol and trend lactate.  Will place on IV Unasyn and azithromycin  Sepsis protocol  Follow cultures  Procalcitonin  BNP normal         VTE prophylaxis: Lovenox

## 2019-03-13 NOTE — DISCHARGE INSTRUCTIONS
Discharge Instructions    Discharged to home by car with relative. Discharged via walking, hospital escort: Yes.  Special equipment needed: Not Applicable    Be sure to schedule a follow-up appointment with your primary care doctor or any specialists as instructed.     Discharge Plan:   Diet Plan: Discussed  Activity Level: Discussed  Smoking Cessation Offered:  (n/a)  Confirmed Follow up Appointment: Appointment Scheduled  Confirmed Symptoms Management: Discussed  Medication Reconciliation Updated: Yes  Influenza Vaccine Indication: Not indicated: Previously immunized this influenza season and > 8 years of age    I understand that a diet low in cholesterol, fat, and sodium is recommended for good health. Unless I have been given specific instructions below for another diet, I accept this instruction as my diet prescription.   Other diet: Regular - no restrictions    Special Instructions: None    Pneumonitis  Pneumonitis is inflammation of the lungs.   CAUSES   Many things can cause pneumonitis. These can include:   · A bacterial or viral infection. Pneumonitis due to an infection is usually called pneumonia.  · Work-related exposures, including farm and industrial work. Some substances that can cause pneumonitis include asbestos, silica, inhaled acids, or inhaled chlorine gas.    · Repeated exposure to bird feathers, bird feces, or other allergens.    · Medicine such as chemotherapy drugs, certain antibiotics, and some heart medicines.    · Radiation therapy.    · Exposure to mold. A hot tub, sauna, or home humidifier can have mold growing in it, even if it looks clean. The mold can be breathed in through water vapor.  · Breathing (aspirating) stomach contents, food, or liquids into the lungs.    SIGNS AND SYMPTOMS   · Cough.    · Shortness of breath or difficulty breathing.    · Fever.    · Decreased energy.    · Decreased appetite.    DIAGNOSIS   To diagnose pneumonitis, your health care provider will do a  complete history and physical exam. Various tests may be ordered, such as:   · Pulmonary function test.    · Chest X-ray.    · CT scan of the lungs.    · Bronchoscopy.    · Lung biopsy.    TREATMENT   Treatment will depend on the cause of the pneumonitis. If the cause is exposure to a substance, avoiding further exposure to that substance will help reduce your symptoms. Possible medical treatments for pneumonitis include:   · Corticosteroid medicine to help decrease inflammation in the lungs.    · Antibiotic medicine to help fight a bacterial lung infection.    · Oxygen therapy if you are having difficulty breathing.    HOME CARE INSTRUCTIONS   · Avoid exposure to any substance identified as the cause of your pneumonitis.    · If you must continue to work with substances that can cause pneumonitis, wear a mask to protect your lungs.    · Only take over-the-counter or prescription medicine as directed by your health care provider.    · Do not smoke.    · If you use inhalers, keep them with you at all times.    · Follow up with your health care provider as directed.    SEEK IMMEDIATE MEDICAL CARE IF:   · You develop new or increased shortness of breath.    · You develop a blue color (cyanosis) under your fingernails.    · You have a fever.    MAKE SURE YOU:   · Understand these instructions.  · Will watch your condition.  · Will get help right away if you are not doing well or get worse.  This information is not intended to replace advice given to you by your health care provider. Make sure you discuss any questions you have with your health care provider.  Document Released: 06/07/2011 Document Revised: 08/20/2014 Document Reviewed: 06/09/2014  [employer] Interactive Patient Education © 2017 [employer]    · Is patient discharged on Warfarin / Coumadin?   No     Depression / Suicide Risk    As you are discharged from this Critical access hospital facility, it is important to learn how to keep safe from harming  yourself.    Recognize the warning signs:  · Abrupt changes in personality, positive or negative- including increase in energy   · Giving away possessions  · Change in eating patterns- significant weight changes-  positive or negative  · Change in sleeping patterns- unable to sleep or sleeping all the time   · Unwillingness or inability to communicate  · Depression  · Unusual sadness, discouragement and loneliness  · Talk of wanting to die  · Neglect of personal appearance   · Rebelliousness- reckless behavior  · Withdrawal from people/activities they love  · Confusion- inability to concentrate     If you or a loved one observes any of these behaviors or has concerns about self-harm, here's what you can do:  · Talk about it- your feelings and reasons for harming yourself  · Remove any means that you might use to hurt yourself (examples: pills, rope, extension cords, firearm)  · Get professional help from the community (Mental Health, Substance Abuse, psychological counseling)  · Do not be alone:Call your Safe Contact- someone whom you trust who will be there for you.  · Call your local CRISIS HOTLINE 371-9426 or 440-003-5925  · Call your local Children's Mobile Crisis Response Team [place] (683) 075-8189 or www.DailyStrength  · Call the toll free National Suicide Prevention Hotlines   · National Suicide Prevention Lifeline 648-713-XONQ (7765)  · National Hope Line Network 800-SUICIDE (165-1594)

## 2019-03-13 NOTE — PROGRESS NOTES
Assessment complete. Pt reports pain in the right shoulder improved to 3/10 following norco per MAR. Cold pack for comfort. Pt O2 sat 93-94% on RA. Safety precautions in place. Call light in reach.

## 2019-03-13 NOTE — PROGRESS NOTES
· 2 RN skin check complete.   · Devices in place N/A.  · Skin assessed under devices N/A.  · Confirmed pressure ulcers found on N/A  · New potential pressure ulcers noted on N/A.   · Skin intact  The following interventions in place: Up SBA, turns self independently in bed.

## 2019-03-13 NOTE — PROGRESS NOTES
Report received from Deborah FERNANDES. POC discussed. Pt resting comfortably in bed. Safety precautions in place.

## 2019-03-13 NOTE — PROGRESS NOTES
Per pt request, this RN called pre-op testing to cancel appointment today at 0915. Patient given information to reschedule appointment upon discharge.

## 2019-03-14 NOTE — DISCHARGE SUMMARY
Discharge Summary    CHIEF COMPLAINT ON ADMISSION  Chief Complaint   Patient presents with   • Shortness of Breath     pt has had chest cold since thursday night, pt increasingly SOB since last night       Reason for Admission  difficulty breathing     Admission Date  3/12/2019    CODE STATUS  Full    HPI & HOSPITAL COURSE  This is a 64 y.o. male with a history of tobacco use, hypertension and chronic pain here with cough and fever found to have sepsis due to pneumonia, comp gated by acute respiratory failure with hypoxia that was related to sepsis.  He was started on empiric antibiotic therapy and supplemental oxygen.  He was given medications for cough and supportive care with IV fluids.  He made a much more rapid than expected recovery based on his initial presentation and was able to wean from oxygen the following hospital day.  He tolerated his antibiotics and was changed to oral antibiotics.  These were also well-tolerated.  His other vital signs remained stable, his fever resolved.  He was discharged in stable condition to complete his outpatient antibiotic course       Therefore, he is discharged in good and stable condition to home with close outpatient follow-up.    The patient recovered much more quickly than anticipated on admission.    Discharge Date  3/13/2019    FOLLOW UP ITEMS POST DISCHARGE  Follow-up with primary care within 1 week    DISCHARGE DIAGNOSES  Principal Problem:    Bilateral pneumonia POA: Unknown  Active Problems:    Severe sepsis (HCC) POA: Unknown    Acute respiratory failure with hypoxia (HCC) POA: Unknown    HTN (hypertension) POA: Unknown    Pain, chronic POA: Unknown    Tobacco use POA: Unknown      Overview:         Resolved Problems:    * No resolved hospital problems. *      FOLLOW UP  Future Appointments  Date Time Provider Department Center   3/15/2019 11:00 AM PREADMIT  1 [facility] None     Arsenio Sarkar M.D.  [address] [place] [address]  [phone]    Go on  3/27/2019  Please arrive at 11:00am for your appointment. Thank you      MEDICATIONS ON DISCHARGE     Medication List      START taking these medications      Instructions   amoxicillin-clavulanate 875-125 MG Tabs  Commonly known as:  AUGMENTIN   Take 1 Tab by mouth 2 times a day for 5 days.  Dose:  1 Tab     azithromycin 250 MG Tabs  Commonly known as:  ZITHROMAX   Take 1 Tab by mouth every day for 4 days.  Dose:  250 mg     guaiFENesin  MG Tb12  Commonly known as:  MUCINEX   Take 1 Tab by mouth every 12 hours.  Dose:  600 mg        CONTINUE taking these medications      Instructions   albuterol 108 (90 Base) MCG/ACT Aers inhalation aerosol   Inhale 2 Puffs by mouth every 6 hours as needed for Shortness of Breath.  Dose:  2 Puff     aspirin 325 MG Tabs  Commonly known as:  ASA   Take 325 mg by mouth every day.  Dose:  325 mg     CO Q 10 PO   Take 1 Cap by mouth every evening.  Dose:  1 Cap     HYDROcodone/acetaminophen  MG Tabs  Commonly known as:  NORCO   Take 1 Tab by mouth 4 times a day.  Dose:  1 Tab     ibuprofen 200 MG Tabs  Commonly known as:  MOTRIN   Take 800 mg by mouth every day.  Dose:  800 mg     KRILL OIL PO   Take 1 Cap by mouth every evening.  Dose:  1 Cap     losartan 50 MG Tabs  Commonly known as:  COZAAR   Take 50 mg by mouth every day.  Dose:  50 mg     vitamin D (Ergocalciferol) 63182 units Caps capsule  Commonly known as:  DRISDOL   Take 50,000 Units by mouth every 7 days. On Tue  Dose:  27502 Units            Allergies  No Known Allergies    DIET  No orders of the defined types were placed in this encounter.      ACTIVITY  As tolerated.  Weight bearing as tolerated    CONSULTATIONS  None    PROCEDURES  None    LABORATORY  Lab Results   Component Value Date    SODIUM 137 03/13/2019    POTASSIUM 3.7 03/13/2019    CHLORIDE 110 03/13/2019    CO2 22 03/13/2019    GLUCOSE 104 (H) 03/13/2019    BUN 22 03/13/2019    CREATININE 0.92 03/13/2019        Lab Results   Component Value Date     WBC 7.1 03/13/2019    HEMOGLOBIN 13.5 (L) 03/13/2019    HEMATOCRIT 38.7 (L) 03/13/2019    PLATELETCT 193 03/13/2019        Total time of the discharge process exceeds 38 minutes.

## 2019-03-14 NOTE — PROGRESS NOTES
Telemetry note:    Rhythm: sinus rhythm, occasional PVCs and PACs  Rate: 60's      SD - 0.16  QRS - 0.08  QT - 0.42    Denies chest pain/dizziness/sob

## 2019-03-15 ENCOUNTER — APPOINTMENT (OUTPATIENT)
Dept: ADMISSIONS | Facility: MEDICAL CENTER | Age: 64
End: 2019-03-15
Attending: ORTHOPAEDIC SURGERY
Payer: MEDICARE

## 2019-03-15 NOTE — OR NURSING
"Preadmit appointment: \" Preparing for your Procedure information\" sheet given to patient with verbal and written instructions. Patient instructed to continue prescribed medications through the day before surgery, instructed to take the following medications the day of surgery per anesthesia protocol: Albuterol if needed, NORCO if needed. Patient will bring Albuterol inhaler day of procedure.     Message left with Dr. Sanders's answering service re: patient recent hospitalization for pnuemonia.  "

## 2019-03-17 LAB
BACTERIA BLD CULT: NORMAL
BACTERIA BLD CULT: NORMAL
SIGNIFICANT IND 70042: NORMAL
SIGNIFICANT IND 70042: NORMAL
SITE SITE: NORMAL
SITE SITE: NORMAL
SOURCE SOURCE: NORMAL
SOURCE SOURCE: NORMAL

## 2019-04-09 ENCOUNTER — ANESTHESIA (OUTPATIENT)
Dept: SURGERY | Facility: MEDICAL CENTER | Age: 64
End: 2019-04-09
Payer: MEDICARE

## 2019-04-09 ENCOUNTER — HOSPITAL ENCOUNTER (OUTPATIENT)
Facility: MEDICAL CENTER | Age: 64
End: 2019-04-10
Attending: ORTHOPAEDIC SURGERY | Admitting: ORTHOPAEDIC SURGERY
Payer: MEDICARE

## 2019-04-09 ENCOUNTER — ANESTHESIA EVENT (OUTPATIENT)
Dept: SURGERY | Facility: MEDICAL CENTER | Age: 64
End: 2019-04-09
Payer: MEDICARE

## 2019-04-09 PROCEDURE — G0378 HOSPITAL OBSERVATION PER HR: HCPCS

## 2019-04-09 PROCEDURE — 160002 HCHG RECOVERY MINUTES (STAT): Performed by: ORTHOPAEDIC SURGERY

## 2019-04-09 PROCEDURE — 160048 HCHG OR STATISTICAL LEVEL 1-5: Performed by: ORTHOPAEDIC SURGERY

## 2019-04-09 PROCEDURE — A4450 NON-WATERPROOF TAPE: HCPCS | Performed by: ORTHOPAEDIC SURGERY

## 2019-04-09 PROCEDURE — 160035 HCHG PACU - 1ST 60 MINS PHASE I: Performed by: ORTHOPAEDIC SURGERY

## 2019-04-09 PROCEDURE — 700102 HCHG RX REV CODE 250 W/ 637 OVERRIDE(OP): Performed by: ORTHOPAEDIC SURGERY

## 2019-04-09 PROCEDURE — A6222 GAUZE <=16 IN NO W/SAL W/O B: HCPCS | Performed by: ORTHOPAEDIC SURGERY

## 2019-04-09 PROCEDURE — 160046 HCHG PACU - 1ST 60 MINS PHASE II: Performed by: ORTHOPAEDIC SURGERY

## 2019-04-09 PROCEDURE — 160022 HCHG BLOCK: Performed by: ORTHOPAEDIC SURGERY

## 2019-04-09 PROCEDURE — 160025 RECOVERY II MINUTES (STATS): Performed by: ORTHOPAEDIC SURGERY

## 2019-04-09 PROCEDURE — 700101 HCHG RX REV CODE 250: Performed by: ANESTHESIOLOGY

## 2019-04-09 PROCEDURE — 160036 HCHG PACU - EA ADDL 30 MINS PHASE I: Performed by: ORTHOPAEDIC SURGERY

## 2019-04-09 PROCEDURE — 700101 HCHG RX REV CODE 250

## 2019-04-09 PROCEDURE — A9270 NON-COVERED ITEM OR SERVICE: HCPCS | Performed by: ORTHOPAEDIC SURGERY

## 2019-04-09 PROCEDURE — 160009 HCHG ANES TIME/MIN: Performed by: ORTHOPAEDIC SURGERY

## 2019-04-09 PROCEDURE — 501838 HCHG SUTURE GENERAL: Performed by: ORTHOPAEDIC SURGERY

## 2019-04-09 PROCEDURE — 160029 HCHG SURGERY MINUTES - 1ST 30 MINS LEVEL 4: Performed by: ORTHOPAEDIC SURGERY

## 2019-04-09 PROCEDURE — 700111 HCHG RX REV CODE 636 W/ 250 OVERRIDE (IP)

## 2019-04-09 PROCEDURE — C1713 ANCHOR/SCREW BN/BN,TIS/BN: HCPCS | Performed by: ORTHOPAEDIC SURGERY

## 2019-04-09 PROCEDURE — 502581 HCHG PACK, SHOULDER ARTHROSCOPY: Performed by: ORTHOPAEDIC SURGERY

## 2019-04-09 PROCEDURE — 700102 HCHG RX REV CODE 250 W/ 637 OVERRIDE(OP): Performed by: ANESTHESIOLOGY

## 2019-04-09 PROCEDURE — 160047 HCHG PACU  - EA ADDL 30 MINS PHASE II: Performed by: ORTHOPAEDIC SURGERY

## 2019-04-09 PROCEDURE — A9270 NON-COVERED ITEM OR SERVICE: HCPCS | Performed by: ANESTHESIOLOGY

## 2019-04-09 PROCEDURE — 700111 HCHG RX REV CODE 636 W/ 250 OVERRIDE (IP): Performed by: ANESTHESIOLOGY

## 2019-04-09 PROCEDURE — 500423 HCHG DRESSING, ABD COMBINE: Performed by: ORTHOPAEDIC SURGERY

## 2019-04-09 PROCEDURE — 160041 HCHG SURGERY MINUTES - EA ADDL 1 MIN LEVEL 4: Performed by: ORTHOPAEDIC SURGERY

## 2019-04-09 DEVICE — ANCHOR ICONIX 1-#2 FORCEFIBER 1.4MM (5EA/BX): Type: IMPLANTABLE DEVICE | Site: SHOULDER | Status: FUNCTIONAL

## 2019-04-09 DEVICE — ANCHOR KNOTLESS REELX STT 4.5MM (5EA/BX): Type: IMPLANTABLE DEVICE | Site: SHOULDER | Status: FUNCTIONAL

## 2019-04-09 RX ORDER — BACITRACIN 65 UNIT/MG
POWDER (GRAM) MISCELLANEOUS
Status: DISCONTINUED | OUTPATIENT
Start: 2019-04-09 | End: 2019-04-09 | Stop reason: HOSPADM

## 2019-04-09 RX ORDER — SODIUM CHLORIDE, SODIUM LACTATE, POTASSIUM CHLORIDE, CALCIUM CHLORIDE 600; 310; 30; 20 MG/100ML; MG/100ML; MG/100ML; MG/100ML
INJECTION, SOLUTION INTRAVENOUS CONTINUOUS
Status: DISCONTINUED | OUTPATIENT
Start: 2019-04-09 | End: 2019-04-09 | Stop reason: HOSPADM

## 2019-04-09 RX ORDER — ONDANSETRON 2 MG/ML
INJECTION INTRAMUSCULAR; INTRAVENOUS PRN
Status: DISCONTINUED | OUTPATIENT
Start: 2019-04-09 | End: 2019-04-09 | Stop reason: SURG

## 2019-04-09 RX ORDER — LOSARTAN POTASSIUM 25 MG/1
50 TABLET ORAL DAILY
Status: DISCONTINUED | OUTPATIENT
Start: 2019-04-10 | End: 2019-04-10 | Stop reason: HOSPADM

## 2019-04-09 RX ORDER — DEXAMETHASONE SODIUM PHOSPHATE 10 MG/ML
INJECTION, SOLUTION INTRAMUSCULAR; INTRAVENOUS PRN
Status: DISCONTINUED | OUTPATIENT
Start: 2019-04-09 | End: 2019-04-09 | Stop reason: SURG

## 2019-04-09 RX ORDER — ONDANSETRON 2 MG/ML
4 INJECTION INTRAMUSCULAR; INTRAVENOUS
Status: DISCONTINUED | OUTPATIENT
Start: 2019-04-09 | End: 2019-04-09 | Stop reason: HOSPADM

## 2019-04-09 RX ORDER — OXYCODONE HYDROCHLORIDE 10 MG/1
10 TABLET ORAL
Status: DISCONTINUED | OUTPATIENT
Start: 2019-04-09 | End: 2019-04-10 | Stop reason: HOSPADM

## 2019-04-09 RX ORDER — HALOPERIDOL 5 MG/ML
1 INJECTION INTRAMUSCULAR
Status: DISCONTINUED | OUTPATIENT
Start: 2019-04-09 | End: 2019-04-09 | Stop reason: HOSPADM

## 2019-04-09 RX ORDER — ALBUTEROL SULFATE 90 UG/1
2 AEROSOL, METERED RESPIRATORY (INHALATION) EVERY 6 HOURS PRN
Status: DISCONTINUED | OUTPATIENT
Start: 2019-04-09 | End: 2019-04-10 | Stop reason: HOSPADM

## 2019-04-09 RX ORDER — IBUPROFEN 400 MG/1
800 TABLET ORAL 3 TIMES DAILY
Status: DISCONTINUED | OUTPATIENT
Start: 2019-04-09 | End: 2019-04-10 | Stop reason: HOSPADM

## 2019-04-09 RX ORDER — MIDAZOLAM HYDROCHLORIDE 1 MG/ML
1 INJECTION INTRAMUSCULAR; INTRAVENOUS
Status: DISCONTINUED | OUTPATIENT
Start: 2019-04-09 | End: 2019-04-09 | Stop reason: HOSPADM

## 2019-04-09 RX ORDER — SIMVASTATIN 20 MG
20 TABLET ORAL NIGHTLY
COMMUNITY

## 2019-04-09 RX ORDER — HYDRALAZINE HYDROCHLORIDE 20 MG/ML
5 INJECTION INTRAMUSCULAR; INTRAVENOUS
Status: DISCONTINUED | OUTPATIENT
Start: 2019-04-09 | End: 2019-04-09 | Stop reason: HOSPADM

## 2019-04-09 RX ORDER — SODIUM CHLORIDE, SODIUM LACTATE, POTASSIUM CHLORIDE, CALCIUM CHLORIDE 600; 310; 30; 20 MG/100ML; MG/100ML; MG/100ML; MG/100ML
INJECTION, SOLUTION INTRAVENOUS CONTINUOUS
Status: ACTIVE | OUTPATIENT
Start: 2019-04-09 | End: 2019-04-10

## 2019-04-09 RX ORDER — LIDOCAINE HYDROCHLORIDE 10 MG/ML
INJECTION, SOLUTION INFILTRATION; PERINEURAL
Status: COMPLETED
Start: 2019-04-09 | End: 2019-04-09

## 2019-04-09 RX ORDER — OXYCODONE HCL 5 MG/5 ML
5 SOLUTION, ORAL ORAL
Status: DISCONTINUED | OUTPATIENT
Start: 2019-04-09 | End: 2019-04-09 | Stop reason: HOSPADM

## 2019-04-09 RX ORDER — DIPHENHYDRAMINE HYDROCHLORIDE 50 MG/ML
12.5 INJECTION INTRAMUSCULAR; INTRAVENOUS
Status: DISCONTINUED | OUTPATIENT
Start: 2019-04-09 | End: 2019-04-09 | Stop reason: HOSPADM

## 2019-04-09 RX ORDER — OXYCODONE HCL 10 MG/1
10 TABLET, FILM COATED, EXTENDED RELEASE ORAL ONCE
Status: COMPLETED | OUTPATIENT
Start: 2019-04-09 | End: 2019-04-09

## 2019-04-09 RX ORDER — MEPERIDINE HYDROCHLORIDE 25 MG/ML
6.25 INJECTION INTRAMUSCULAR; INTRAVENOUS; SUBCUTANEOUS
Status: DISCONTINUED | OUTPATIENT
Start: 2019-04-09 | End: 2019-04-09 | Stop reason: HOSPADM

## 2019-04-09 RX ORDER — HYDROCODONE BITARTRATE AND ACETAMINOPHEN 10; 325 MG/1; MG/1
1 TABLET ORAL 4 TIMES DAILY
Status: DISCONTINUED | OUTPATIENT
Start: 2019-04-09 | End: 2019-04-10

## 2019-04-09 RX ORDER — IBUPROFEN 400 MG/1
800 TABLET ORAL DAILY
Status: DISCONTINUED | OUTPATIENT
Start: 2019-04-10 | End: 2019-04-09

## 2019-04-09 RX ORDER — CEFAZOLIN SODIUM 1 G/3ML
INJECTION, POWDER, FOR SOLUTION INTRAMUSCULAR; INTRAVENOUS PRN
Status: DISCONTINUED | OUTPATIENT
Start: 2019-04-09 | End: 2019-04-09 | Stop reason: SURG

## 2019-04-09 RX ORDER — CELECOXIB 200 MG/1
400 CAPSULE ORAL ONCE
Status: COMPLETED | OUTPATIENT
Start: 2019-04-09 | End: 2019-04-09

## 2019-04-09 RX ORDER — HYDROMORPHONE HYDROCHLORIDE 2 MG/ML
INJECTION, SOLUTION INTRAMUSCULAR; INTRAVENOUS; SUBCUTANEOUS PRN
Status: DISCONTINUED | OUTPATIENT
Start: 2019-04-09 | End: 2019-04-09 | Stop reason: SURG

## 2019-04-09 RX ORDER — SIMVASTATIN 20 MG
20 TABLET ORAL NIGHTLY
Status: DISCONTINUED | OUTPATIENT
Start: 2019-04-09 | End: 2019-04-10 | Stop reason: HOSPADM

## 2019-04-09 RX ORDER — OXYCODONE HCL 5 MG/5 ML
10 SOLUTION, ORAL ORAL
Status: DISCONTINUED | OUTPATIENT
Start: 2019-04-09 | End: 2019-04-09 | Stop reason: HOSPADM

## 2019-04-09 RX ORDER — ASPIRIN 325 MG
325 TABLET ORAL DAILY
Status: DISCONTINUED | OUTPATIENT
Start: 2019-04-10 | End: 2019-04-10 | Stop reason: HOSPADM

## 2019-04-09 RX ORDER — BUPIVACAINE HYDROCHLORIDE 2.5 MG/ML
INJECTION, SOLUTION EPIDURAL; INFILTRATION; INTRACAUDAL PRN
Status: DISCONTINUED | OUTPATIENT
Start: 2019-04-09 | End: 2019-04-09 | Stop reason: SURG

## 2019-04-09 RX ORDER — DEXAMETHASONE SODIUM PHOSPHATE 4 MG/ML
INJECTION, SOLUTION INTRA-ARTICULAR; INTRALESIONAL; INTRAMUSCULAR; INTRAVENOUS; SOFT TISSUE PRN
Status: DISCONTINUED | OUTPATIENT
Start: 2019-04-09 | End: 2019-04-09 | Stop reason: SURG

## 2019-04-09 RX ORDER — ACETAMINOPHEN 500 MG
1000 TABLET ORAL ONCE
Status: COMPLETED | OUTPATIENT
Start: 2019-04-09 | End: 2019-04-09

## 2019-04-09 RX ORDER — ACETAMINOPHEN 500 MG
1000 TABLET ORAL EVERY 6 HOURS
Status: DISCONTINUED | OUTPATIENT
Start: 2019-04-09 | End: 2019-04-10 | Stop reason: HOSPADM

## 2019-04-09 RX ORDER — SODIUM CHLORIDE, SODIUM GLUCONATE, SODIUM ACETATE, POTASSIUM CHLORIDE AND MAGNESIUM CHLORIDE 526; 502; 368; 37; 30 MG/100ML; MG/100ML; MG/100ML; MG/100ML; MG/100ML
INJECTION, SOLUTION INTRAVENOUS
Status: DISCONTINUED | OUTPATIENT
Start: 2019-04-09 | End: 2019-04-09 | Stop reason: SURG

## 2019-04-09 RX ORDER — EPINEPHRINE 1 MG/ML(1)
AMPUL (ML) INJECTION
Status: DISCONTINUED | OUTPATIENT
Start: 2019-04-09 | End: 2019-04-09 | Stop reason: HOSPADM

## 2019-04-09 RX ORDER — ONDANSETRON 2 MG/ML
4 INJECTION INTRAMUSCULAR; INTRAVENOUS EVERY 4 HOURS PRN
Status: DISCONTINUED | OUTPATIENT
Start: 2019-04-09 | End: 2019-04-10 | Stop reason: HOSPADM

## 2019-04-09 RX ADMIN — IBUPROFEN 800 MG: 400 TABLET ORAL at 19:45

## 2019-04-09 RX ADMIN — ONDANSETRON 4 MG: 2 INJECTION INTRAMUSCULAR; INTRAVENOUS at 13:51

## 2019-04-09 RX ADMIN — HYDROCODONE BITARTRATE AND ACETAMINOPHEN 1 TABLET: 10; 325 TABLET ORAL at 20:04

## 2019-04-09 RX ADMIN — Medication 0.15 MG: at 13:41

## 2019-04-09 RX ADMIN — ACETAMINOPHEN 1000 MG: 500 TABLET, COATED ORAL at 13:32

## 2019-04-09 RX ADMIN — SIMVASTATIN 20 MG: 20 TABLET, FILM COATED ORAL at 20:04

## 2019-04-09 RX ADMIN — SODIUM CHLORIDE, SODIUM GLUCONATE, SODIUM ACETATE, POTASSIUM CHLORIDE AND MAGNESIUM CHLORIDE: 526; 502; 368; 37; 30 INJECTION, SOLUTION INTRAVENOUS at 15:04

## 2019-04-09 RX ADMIN — SUGAMMADEX 200 MG: 100 INJECTION, SOLUTION INTRAVENOUS at 15:50

## 2019-04-09 RX ADMIN — HYDROMORPHONE HYDROCHLORIDE 1 MG: 2 INJECTION, SOLUTION INTRAMUSCULAR; INTRAVENOUS; SUBCUTANEOUS at 13:51

## 2019-04-09 RX ADMIN — CEFAZOLIN 1 G: 1 INJECTION, POWDER, FOR SOLUTION INTRAVENOUS at 13:53

## 2019-04-09 RX ADMIN — LIDOCAINE HYDROCHLORIDE 0.5 ML: 10 INJECTION, SOLUTION INFILTRATION; PERINEURAL at 13:33

## 2019-04-09 RX ADMIN — BUPIVACAINE HYDROCHLORIDE 20 ML: 2.5 INJECTION, SOLUTION EPIDURAL; INFILTRATION; INTRACAUDAL; PERINEURAL at 13:41

## 2019-04-09 RX ADMIN — ROCURONIUM BROMIDE 50 MG: 10 INJECTION INTRAVENOUS at 13:51

## 2019-04-09 RX ADMIN — Medication 0.5 ML: at 13:33

## 2019-04-09 RX ADMIN — MIDAZOLAM HYDROCHLORIDE 2 MG: 1 INJECTION, SOLUTION INTRAMUSCULAR; INTRAVENOUS at 13:40

## 2019-04-09 RX ADMIN — DEXAMETHASONE SODIUM PHOSPHATE 8 MG: 4 INJECTION, SOLUTION INTRAMUSCULAR; INTRAVENOUS at 13:51

## 2019-04-09 RX ADMIN — CELECOXIB 400 MG: 200 CAPSULE ORAL at 13:31

## 2019-04-09 RX ADMIN — CEFAZOLIN 2 G: 1 INJECTION, POWDER, FOR SOLUTION INTRAVENOUS at 13:51

## 2019-04-09 RX ADMIN — PROPOFOL 200 MG: 10 INJECTION, EMULSION INTRAVENOUS at 13:51

## 2019-04-09 RX ADMIN — SODIUM CHLORIDE, SODIUM LACTATE, POTASSIUM CHLORIDE, CALCIUM CHLORIDE: 600; 310; 30; 20 INJECTION, SOLUTION INTRAVENOUS at 13:34

## 2019-04-09 RX ADMIN — SUGAMMADEX 100 MG: 100 INJECTION, SOLUTION INTRAVENOUS at 15:26

## 2019-04-09 RX ADMIN — OXYCODONE HYDROCHLORIDE 10 MG: 10 TABLET, FILM COATED, EXTENDED RELEASE ORAL at 13:32

## 2019-04-09 RX ADMIN — SODIUM CHLORIDE, SODIUM LACTATE, POTASSIUM CHLORIDE, CALCIUM CHLORIDE: 600; 310; 30; 20 INJECTION, SOLUTION INTRAVENOUS at 13:48

## 2019-04-09 RX ADMIN — DEXAMETHASONE SODIUM PHOSPHATE 2 MG: 10 INJECTION, SOLUTION INTRAMUSCULAR; INTRAVENOUS at 13:41

## 2019-04-09 ASSESSMENT — PATIENT HEALTH QUESTIONNAIRE - PHQ9
1. LITTLE INTEREST OR PLEASURE IN DOING THINGS: NOT AT ALL
1. LITTLE INTEREST OR PLEASURE IN DOING THINGS: NOT AT ALL
SUM OF ALL RESPONSES TO PHQ9 QUESTIONS 1 AND 2: 0
SUM OF ALL RESPONSES TO PHQ9 QUESTIONS 1 AND 2: 0
2. FEELING DOWN, DEPRESSED, IRRITABLE, OR HOPELESS: NOT AT ALL
2. FEELING DOWN, DEPRESSED, IRRITABLE, OR HOPELESS: NOT AT ALL

## 2019-04-09 ASSESSMENT — COGNITIVE AND FUNCTIONAL STATUS - GENERAL
STANDING UP FROM CHAIR USING ARMS: A LITTLE
DRESSING REGULAR UPPER BODY CLOTHING: A LITTLE
DRESSING REGULAR LOWER BODY CLOTHING: A LITTLE
EATING MEALS: A LITTLE
MOVING TO AND FROM BED TO CHAIR: A LITTLE
PERSONAL GROOMING: A LITTLE
CLIMB 3 TO 5 STEPS WITH RAILING: A LITTLE
HELP NEEDED FOR BATHING: A LITTLE
MOBILITY SCORE: 19
DAILY ACTIVITIY SCORE: 18
SUGGESTED CMS G CODE MODIFIER MOBILITY: CK
SUGGESTED CMS G CODE MODIFIER DAILY ACTIVITY: CK
TURNING FROM BACK TO SIDE WHILE IN FLAT BAD: A LITTLE
TOILETING: A LITTLE
MOVING FROM LYING ON BACK TO SITTING ON SIDE OF FLAT BED: A LITTLE

## 2019-04-09 ASSESSMENT — LIFESTYLE VARIABLES
EVER_SMOKED: NEVER
ALCOHOL_USE: NO

## 2019-04-09 ASSESSMENT — PAIN SCALES - GENERAL: PAIN_LEVEL: 2

## 2019-04-09 NOTE — OR NURSING
1539 To PACU from OR via alia. Pt sleeping, respirations spontaneous and non-labored. Surgical dressing to left shoulder, immobilizer in place. Pt quickly became very restless, attempting to remove immobilizer, and demanding that oxygen mask be removed. Oxygen mask removed and pt assisted into an upright position. Oxygen saturation began to drop and pt became very anxious reporting that he couldn't breathe. Medication administered by anesthesiologist.  1554 Pt calmer at this time. Fingers to affected extremity are pink and warm, brisk cap refill observed, radial pulse palpable. Pt able to move fingers to affected extremity, reports numbness. Pt has no complaints.  1612 No changes. Pt instructed in use of inspirometer.  1627 No changes.  1700 Pt dressed and up to a recliner chair. Pt still requiring supplemental oxygen to maintain oxygen saturation of at least 90%. Pt favors the idea of admission as opposed to going home with oxygen. Will address with Dr Sanders as soon as she is available. Pt's wife brought into the recovery room.  1730 Pt reassessed by Dr Sanders and Dr Contreras. Pt encouraged to work with inspirometer and ambulate, if need be home oxygen paperwork has been signed or Dr Sanders is willing to admit.   1745 Pt to be transferred to discharge area. Report to DENA Meléndez.

## 2019-04-09 NOTE — ANESTHESIA TIME REPORT
Anesthesia Start and Stop Event Times     Date Time Event    4/9/2019 1348 Anesthesia Start     1541 Anesthesia Stop        Responsible Staff  04/09/19    Name Role Begin End    Marcellus Contreras M.D. Anesth 1348 1541        Preop Diagnosis (Free Text):  Pre-op Diagnosis     M75.42, M75.112        Preop Diagnosis (Codes):  Diagnosis Information     Diagnosis Code(s):         Post op Diagnosis  Impingement syndrome of left shoulder      Premium Reason  A. 3PM - 7AM    Comments:

## 2019-04-09 NOTE — ANESTHESIA POSTPROCEDURE EVALUATION
Patient: Heri Delgado    Procedure Summary     Date:  04/09/19 Room / Location:   OR  / SURGERY [facility]    Anesthesia Start:  1348 Anesthesia Stop:  1541    Procedures:       SHOULDER DECOMPRESSION ARTHROSCOPIC - SUBACROMIAL W/LABRAL DEBRIDEMENT (Left Shoulder)      SHOULDER ARTHROSCOPY W/ ROTATOR CUFF REPAIR - MESSER (Left Shoulder)      TENODESIS, BICEPS (Left Shoulder) Diagnosis:  (M75.42, M75.112)    Surgeon:  Catrachita Sanders M.D. Responsible Provider:  Marcellus Contreras M.D.    Anesthesia Type:  general, peripheral nerve block ASA Status:  2          Final Anesthesia Type: general, peripheral nerve block  Last vitals  BP   Blood Pressure : 155/87    Temp   (!) 35.7 °C (96.3 °F)    Pulse   Pulse: 78   Resp   16    SpO2   96 %      Anesthesia Post Evaluation    Patient location during evaluation: PACU  Patient participation: complete - patient participated  Level of consciousness: awake and alert  Pain score: 2    Airway patency: patent  Anesthetic complications: no  Cardiovascular status: hemodynamically stable  Respiratory status: acceptable  Hydration status: euvolemic  Comments: US Guided Interscalene Brachial Plexus Block   US transducer placed on the neck in oblique plane approximately at the level of C6.  Anterior and Middle Scalene (MSM) muscles identified with nerve trunks identified between the muscles.  Needle inserted lateral to probe and advanced under direct visualization through the MSM into a perineural position.  After negative aspiration, LA injected with ease and visualized surrounding the nerve trunks.    PONV: none           Nurse Pain Score: 5 (NPRS)

## 2019-04-09 NOTE — ANESTHESIA PROCEDURE NOTES
Airway  Date/Time: 4/9/2019 1:52 PM  Performed by: ARNULFO ECHOLS  Authorized by: ARNULFO ECHOLS     Location:  OR  Urgency:  Elective  Indications for Airway Management:  Anesthesia  Spontaneous Ventilation: absent    Sedation Level:  Deep  Preoxygenated: Yes    Patient Position:  Sniffing  Final Airway Type:  Endotracheal airway  Final Endotracheal Airway:  ETT  Cuffed: Yes    Technique Used for Successful ETT Placement:  Direct laryngoscopy  Insertion Site:  Oral  Blade Type:  [place]  Laryngoscope Blade/Videolaryngoscope Blade Size:  3  ETT Size (mm):  8.0  Measured from:  Teeth  ETT to Teeth (cm):  20  Placement Verified by: auscultation and capnometry    Cormack-Lehane Classification:  Grade IIa - partial view of glottis  Number of Attempts at Approach:  1

## 2019-04-09 NOTE — ANESTHESIA PREPROCEDURE EVALUATION
Relevant Problems   (+) Acute respiratory failure with hypoxia (HCC)   (+) Bilateral pneumonia   (+) HTN (hypertension)   (+) Pain, chronic   (+) Severe sepsis (HCC)   (+) Tobacco use   HTN, high chol, ELAN, h/o pneumonia sepsis, smoker    Physical Exam    Airway   Mallampati: II  TM distance: >3 FB  Neck ROM: full       Cardiovascular - normal exam  Rhythm: regular  Rate: normal  (-) murmur     Dental - normal exam      Very poor dentition   Pulmonary - normal exam  Breath sounds clear to auscultation     Abdominal    Neurological - normal exam                 Anesthesia Plan    ASA 2       Plan - general and peripheral nerve block     Peripheral nerve block will be post-op pain control  Airway plan will be ETT        Induction: intravenous    Postoperative Plan: Postoperative administration of opioids is intended.    Pertinent diagnostic labs and testing reviewed    Informed Consent:    Anesthetic plan and risks discussed with patient.    Use of blood products discussed with: patient whom consented to blood products.

## 2019-04-09 NOTE — ANESTHESIA PROCEDURE NOTES
Peripheral Block  Performed by: ARNULFO ECHOLS  Authorized by: ARNULFO ECHOLS     Patient Location:  Pre-op  Start Time:  4/9/2019 1:41 PM  End Time:  4/9/2019 1:44 PM  Reason for Block: at surgeon's request and post-op pain management    patient identified, IV checked, site marked, risks and benefits discussed, surgical consent, monitors and equipment checked, pre-op evaluation and timeout performed    Patient Position:  Sitting  Prep: ChloraPrep    Monitoring:  Continuous pulse ox  Block Region:  Upper Extremity  Upper Extremity - Block Type:  Interscalene  Laterality:  Left  Procedures: ultrasound guided and nerve stimulator  Image captured, interpreted and electronically stored.    Strength:  2 %  Dose:  2 ml  Block Type:  Single-shot  Needle Length:  100mm  Needle Gauge:  21 G  Needle Localization:  Nerve stimulator and ultrasound guidance  Injection Assessment:  Negative aspiration for heme, no paresthesia on injection, incremental injection and local visualized surrounding nerve on ultrasound  Evidence of intravascular injection: No     US Guided Interscalene Brachial Plexus Block   US transducer placed on the neck in oblique plane approximately at the level of C6.  Anterior and Middle Scalene (MSM) muscles identified with nerve trunks identified between the muscles.  Needle inserted lateral to probe and advanced under direct visualization through the MSM into a perineural position.  After negative aspiration, LA injected with ease and visualized surrounding the nerve trunks.

## 2019-04-09 NOTE — OR SURGEON
Immediate Post OP Note    PreOp Diagnosis: LEFT shoulder impingement, RCT    PostOp Diagnosis: SAME with SLAP lesion    Procedure(s): LEFT  SHOULDER DECOMPRESSION ARTHROSCOPIC - SUBACROMIAL W/LABRAL DEBRIDEMENT - Wound Class: Clean  SHOULDER ARTHROSCOPY W/ ROTATOR CUFF REPAIR - MESSER - Wound Class: Clean  TENODESIS, BICEPS - Wound Class: Clean    Surgeon(s):  Catrachita Sanders M.D.  Assistant: Keely Harvey CFA    Anesthesiologist/Type of Anesthesia:  Anesthesiologist: Marcellus Contreras M.D./General    Surgical Staff:  Circulator: Tammy Blue R.N.  Scrub Person: Colleen Hernández    Specimens removed if any:  * No specimens in log *    Estimated Blood Loss: min  Findings: as above      Complications: none    Richard        4/9/2019 3:39 PM Catrachita Sanders M.D.

## 2019-04-09 NOTE — ANESTHESIA QCDR
2019 [facility] Clinical Data Registry (for Quality Improvement)     Postoperative nausea/vomiting risk protocol (Adult = 18 yrs and Pediatric 3-17 yrs)- (430 and 463)  General inhalation anesthetic (NOT TIVA) with PONV risk factors: Yes  Provision of anti-emetic therapy with at least 2 different classes of agents: Yes   Patient DID NOT receive anti-emetic therapy and reason is documented in Medical Record:  N/A    Multimodal Pain Management- (AQI59)  Patient undergoing Elective Surgery (i.e. Outpatient, or ASC, or Prescheduled Surgery prior to Hospital Admission): Yes  Use of Multimodal Pain Management, two or more drugs and/or interventions, NOT including systemic opioids: Yes   Exception: Documented allergy to multiple classes of analgesics:  N/A    PACU assessment of acute postoperative pain prior to Anesthesia Care End- Applies to Patients Age = 18- (ABG7)  Initial PACU pain score is which of the following: < 7/10  Patient unable to report pain score: N/A    Post-anesthetic transfer of care checklist/protocol to PACU/ICU- (426 and 427)  Upon conclusion of case, patient transferred to which of the following locations: PACU/Non-ICU  Use of transfer checklist/protocol: Yes  Exclusion: Service Performed in Patient Hospital Room (and thus did not require transfer): N/A    PACU Reintubation- (AQI31)  General anesthesia requiring endotracheal intubation (ETT) along with subsequent extubation in OR or PACU: Yes  Required reintubation in the PACU: No   Extubation was a planned trial documented in the medical record prior to removal of the original airway device:  N/A    Unplanned admission to ICU related to anesthesia service up through end of PACU care- (MD51)  Unplanned admission to ICU (not initially anticipated at anesthesia start time): No

## 2019-04-09 NOTE — OP REPORT
DATE OF SERVICE:  04/09/2019    PREOPERATIVE DIAGNOSES:  Left shoulder impingement syndrome, rotator cuff   tear.    POSTOPERATIVE DIAGNOSES:  Left shoulder impingement syndrome, rotator cuff   tear, SLAP lesion with proximal biceps tendinopathy.    PROCEDURES PERFORMED:  Left shoulder arthroscopy, subacromial decompression,   superior labral debridement, arthroscopic suprapectoral proximal biceps   tenodesis, arthroscopic rotator cuff repair.    SURGEON:  Catrachita Sanders MD    ASSISTANT:  Keely Harvey CFA    ANESTHESIOLOGIST:  Marcellus Contreras MD    TYPE OF ANESTHESIA:  General, with preoperative interscalene nerve block.    IV FLUID:  1 liter crystalloid.    ESTIMATED BLOOD LOSS:  Minimal.    DRAINS:  None.    SPECIMENS:  None.    COMPLICATIONS:  None.    IMPLANTS:  Richard Iconix anchors x2, ReelX anchor x1.    REASON FOR PROCEDURE:  The patient is a 64-year-old male who sustained an   acute fall and injury to the left shoulder last October.  An MRI demonstrated   a rotator cuff defect.  We decided to proceed with arthroscopy and repair, due   to his ongoing symptoms.    DESCRIPTION OF OPERATION:  The patient was given a left interscalene nerve   block by the anesthesiologist before surgery.  Once back in the operating   room, a breathing tube was placed.  He was given 3 g of IV Ancef.  He was then   placed in the lateral decubitus position.  Care was taken to pad his axilla   as well as all bony prominences.  The left upper extremity was then prepped   with ChloraPrep and draped in a standard sterile fashion.  It was then placed   in the Arthrex traction device.  Bony landmarks were drawn and a standard   posterior portal was established.  The arthroscope was then inserted into the   glenohumeral joint.  An anterosuperior cannula was placed in the rotator   interval, just beneath the biceps tendon.  A probe was inserted.  There was an   unstable SLAP lesion noted with proximal biceps tendinopathy.  The  decision   was made to perform a tenodesis.  The superior labrum was debrided.  The   posterior labrum was debrided.  There was minimal degenerative change noted in   the glenohumeral joint.  The subscapularis tendon was intact.  There was no   obvious rotator cuff tear noted from the articular side.  The arthroscope was   inserted into the subacromial space.  A lateral portal was established.  There   was a copious amount of thickened, inflamed bursal tissue.  This was removed   with the 4.0 aggressive shaver.  Borders of the acromion were defined.  The   5.5 mm resector was used to remove the anterior curve as well as lateral edge.    Portals were switched.  Using a standard cutting block technique from   posterior to anterior, a subacromial decompression was completed.  Attention   was turned to the rotator cuff below.  It was a relatively high-grade   bursal-sided tear.  This appeared to involve the leading edge of the   supraspinatus with mild retraction.  The edges were debrided.  This would be   later repaired after the tenodesis was performed.  The arthroscope was then   inserted into the anterosuperior cannula, aiming down the arm.  The bicipital   groove was identified in the suprapectoral region.  The biceps groove was   opened.  The 4.0 aggressive shaver was used coming in from the lateral portal   as well as an accessory percutaneous suprapectoral portal that was established   to prepare the groove to create a healing response for the tendon tenodesis   site.  The ball-tipped rasp as well as 4.0 aggressive shaver were both used.    The biceps tendon appeared healthy in this location.  Through the   suprapectoral portal, a single Iconix anchor was drilled and tapped into place   just proximal to the pectoralis major and lateral to the biceps tendon.  The   cobra device was then used to take a cinch suture through the biceps tendon   with the free limb coming in from underneath the tendon to the  pectoralis.    These 2 sutures were then docked out of the suprapectoral portal.  A second   Iconix anchor for the tenodesis was drilled and tapped into placed medial to   the tendon.  From underneath the tendon, a single limb of suture was grasped   and then the cobra device was used to come through the biceps tendon and   another cinch suture was taken.  The more proximal anchor was tied down first   followed by the more distal anchor.  The long tipped Bovie as well as   arthroscopic scissors were both used then to tenotomize the biceps.  The   arthroscope was placed back into the glenohumeral joint.  The biceps tendon   was released off of the superior glenoid tubercle with the superior labrum   debrided.  With the arthroscope back in the suprapectoral region, a tenotomy   was completed and the 4-5 cm segment biceps tendon was removed and   photographed.  Hemostasis was maintained in the suprapectoral region with the   surface wand.  The arthroscope was then inserted into the subacromial space.    The lateral aspect of the acromioplasty was completed.  The arthroscope was   then placed laterally to visualize the far anterior tear.  This was a   high-grade partial thickness bursal-sided tear.  There was a deep enough   pocket to allow for an inverted horizontal mattress suture to be placed with   the cobra device.  This was done through an anterior portal with the 8 mm   cannula.  A 2 mm tape was passed.  The arthroscope was then placed   posteriorly.  The 8 mm cannula was then used to load the 2 mm tape into a   ReelX anchor, which was tapped, then expanded, nicely creating the high-grade   mildly retracted partial thickness tear over the prepared defect.  An   excellent repair was achieved.  One liter of bacitracin saline fluid was   flushed through the subacromial space.  All instruments were then removed.    Portals were closed with 3-0 nylon suture.  A sterile dressing was applied.    All drapes were removed.   The arm was carefully taken out of traction and   placed into a shoulder abduction sling.  Patient was placed supine on a   stretcher and taken to recovery room, in stable condition.       ____________________________________     MD DESHAUN RAYMUNDO / JEISON    DD:  04/09/2019 15:47:26  DT:  04/09/2019 16:41:01    D#:  [id number]  Job#:  [id number]

## 2019-04-09 NOTE — OR NURSING
Patient to preop, allergies and NPO status verified, home medications reconciled, belongings secured, verbalizes understanding of pain scale, surgical site verified, IV access established by DENA Crain, SCDs/ SHEA hose in place, triple aim completed.

## 2019-04-10 VITALS
HEIGHT: 71 IN | DIASTOLIC BLOOD PRESSURE: 54 MMHG | HEART RATE: 73 BPM | RESPIRATION RATE: 18 BRPM | SYSTOLIC BLOOD PRESSURE: 105 MMHG | BODY MASS INDEX: 35.71 KG/M2 | OXYGEN SATURATION: 92 % | TEMPERATURE: 97.6 F | WEIGHT: 255.07 LBS

## 2019-04-10 PROCEDURE — 97535 SELF CARE MNGMENT TRAINING: CPT

## 2019-04-10 PROCEDURE — 97165 OT EVAL LOW COMPLEX 30 MIN: CPT

## 2019-04-10 PROCEDURE — 700111 HCHG RX REV CODE 636 W/ 250 OVERRIDE (IP): Performed by: ORTHOPAEDIC SURGERY

## 2019-04-10 PROCEDURE — 700102 HCHG RX REV CODE 250 W/ 637 OVERRIDE(OP): Performed by: ORTHOPAEDIC SURGERY

## 2019-04-10 PROCEDURE — G0378 HOSPITAL OBSERVATION PER HR: HCPCS

## 2019-04-10 PROCEDURE — A9270 NON-COVERED ITEM OR SERVICE: HCPCS | Performed by: ORTHOPAEDIC SURGERY

## 2019-04-10 RX ORDER — HYDROCODONE BITARTRATE AND ACETAMINOPHEN 10; 325 MG/1; MG/1
1 TABLET ORAL EVERY 6 HOURS
Status: DISCONTINUED | OUTPATIENT
Start: 2019-04-10 | End: 2019-04-10 | Stop reason: HOSPADM

## 2019-04-10 RX ORDER — ALUMINA, MAGNESIA, AND SIMETHICONE 2400; 2400; 240 MG/30ML; MG/30ML; MG/30ML
10 SUSPENSION ORAL 4 TIMES DAILY PRN
Status: DISCONTINUED | OUTPATIENT
Start: 2019-04-10 | End: 2019-04-10 | Stop reason: HOSPADM

## 2019-04-10 RX ADMIN — ONDANSETRON 4 MG: 2 INJECTION INTRAMUSCULAR; INTRAVENOUS at 02:26

## 2019-04-10 RX ADMIN — HYDROCODONE BITARTRATE AND ACETAMINOPHEN 1 TABLET: 10; 325 TABLET ORAL at 05:58

## 2019-04-10 RX ADMIN — HYDROCODONE BITARTRATE AND ACETAMINOPHEN 1 TABLET: 10; 325 TABLET ORAL at 10:59

## 2019-04-10 RX ADMIN — OXYCODONE HYDROCHLORIDE 10 MG: 10 TABLET ORAL at 01:29

## 2019-04-10 RX ADMIN — ALUMINUM HYDROXIDE, MAGNESIUM HYDROXIDE, AND DIMETHICONE 10 ML: 400; 400; 40 SUSPENSION ORAL at 01:33

## 2019-04-10 RX ADMIN — OXYCODONE HYDROCHLORIDE 10 MG: 10 TABLET ORAL at 09:14

## 2019-04-10 RX ADMIN — ASPIRIN 325 MG ORAL TABLET 325 MG: 325 PILL ORAL at 05:58

## 2019-04-10 RX ADMIN — LOSARTAN POTASSIUM 50 MG: 25 TABLET ORAL at 05:58

## 2019-04-10 ASSESSMENT — ACTIVITIES OF DAILY LIVING (ADL): TOILETING: INDEPENDENT

## 2019-04-10 NOTE — PROGRESS NOTES
2 RN skin assessment done; skin not WDL. See Wound flowsheet. Pt post left shoulder decompression/ debridement. Silver dressing CDI.

## 2019-04-10 NOTE — PROGRESS NOTES
Pt arrived from Pacu dept via wheelchair accompanied by RN  And pts family member. A/O X4, ambulates to bed w/.o difficulties. Pt made aware of of POC- pain mgm't., home meds. Pt w/. Good awareness for safety, able to follow instruction. Call light use for assistance/ needs encouraged. Will continue to monitor.

## 2019-04-10 NOTE — OR NURSING
1745 - Report from DENA Joseph. Patient awake and cooperative. Left shoulder area dressing clean, dry, and intact. Positive CMS left fingers. Immobilizer in place. Patient ambulated on RA from PACU 1 and PACU 2 - O2 Sats as documented. Then placed back on O2 via NC.  Patient to bathroom - voided without difficulty then assisted to lounge chair. Denies pain. Denies nausea. Tolerating soda. VS as noted    1815 -  Call Dr. Sanders regarding O2 Sat results -  She is in a meeting - will write orders to admit patient - patient and spouse in agreement with plan.     1850 - Patient remains as noted above. VS as noted. Called Dr. Sanders - she will do orders in a few minutes.   1910 - Orders completed by Dr. Sanders. Report called to DENA Grove.     1920 - Patient remains as noted above. Denies pain. Denies nausea. Dressing clean, dry, and intact. Meets criteria and transferred to G[facility] via wheelchair on O2 via NC at 2 lpm - accompanied by spouse and son.

## 2019-04-10 NOTE — PROGRESS NOTES
"Orthopedic Progress Note  Subjective:  Patient doing well, breathing well without distress.  Pain controlled.   Date of Surgery: 4/9/2019  Post-op Day: 1 Day Post-Op    Exam:  /75   Pulse 67   Temp 36.3 °C (97.4 °F) (Oral)   Resp 18   Ht 1.803 m (5' 11\")   Wt 115.7 kg (255 lb 1.2 oz)   SpO2 94%   BMI 35.58 kg/m²     Dsg dci, sling on    Ins/Outs:  Intake/Output       04/09/19 0700 - 04/10/19 0659 04/10/19 0700 - 04/11/19 0659      [id number] [id number] Total [id number] 1009-2726 Total       Intake    P.O.  200  850 1050  --  -- --    P.O.  -- -- --    I.V.  1600  -- 1600  --  -- --    Volume (mL) (lactated ringers infusion) 1600 -- 1600 -- -- --    Total Intake 0399 239 1967 -- -- --       Output    Urine  50  400 450  --  -- --    Number of Times Voided 1 x 4 x 5 x -- -- --    Urine Void (mL) 50 400 450 -- -- --    Blood  10  -- 10  --  -- --    Est. Blood Loss 10 -- 10 -- -- --    Total Output 60 400 460 -- -- --       Net I/O     7039 019 8501 -- -- --          Intake/Output Summary (Last 24 hours) at 04/10/19 0816  Last data filed at 04/10/19 0200   Gross per 24 hour   Intake             2650 ml   Output              460 ml   Net             2190 ml     Labs:            Medications:  • HYDROcodone/acetaminophen  1 Tab Q6HRS     • mag hydrox-al hydrox-simeth  10 mL 4X/DAY PRN     • albuterol  2 Puff Q6HRS PRN     • aspirin  325 mg DAILY     • losartan  50 mg DAILY     • simvastatin  20 mg Nightly     • Pharmacy Consult Request  1 Each PHARMACY TO DOSE     • acetaminophen  1,000 mg Q6HRS     • ibuprofen  800 mg TID     • oxyCODONE immediate release  10 mg Q3HRS PRN     • ondansetron  4 mg Q4HRS PRN       Imaging:  None    Quality:  Date of Admission: 4/9/2019  Hospital day #0  Prophylactic anticoagulation: ECASA  Assessment and Plan:  D/C home today      Catrachita Sanders M.D.  "

## 2019-04-10 NOTE — DISCHARGE INSTRUCTIONS
Post-Operative Shoulder Instructions       Dressing and wound care: Keep your shoulder dressing clean and dry after surgery.  Be aware that some leaking of blood or fluid from your dressing can occur and is normal. You may remove your dressing 3 days after the operation.  Notice that you have a single incision and/or several small incisions that have been closed with stitches.  Cover each of these incisions with a light dressing or band-aids.  This keeps the surgical incisions clean, as well as preventing your clothes from spotting with blood or fluid.  Change band-aids or light dressing daily.     Shower / bathing: Keep the shoulder dry for 3 days after your surgery.  Then, you may shower. You may let soap and water run over skin incisions, but do not immerse your shoulder in water.  No swimming pools, hot tubs, or baths are recommended until at least 3 weeks after surgery.     ** If you have had a shoulder replacement, then please wait until your staples are removed at 7-14 days post-op before allowing your incision to get wet.       Ice: Apply an ice pack to your shoulder (15 minutes on the shoulder, 15 minutes off the shoulder), as you feel necessary to help with the pain and swelling.         Sling / Shoulder Immobilizer: The sling should be on at all times, except when bathing and doing your demonstrated exercises.     Activity: It is important to move your shoulder, as well as your elbow, wrist, and hand several times daily, starting the day after surgery.  You may do pendulum exercises with your operative arm starting the day after surgery.  Pendulum (dangling [place]) exercises are encouraged 2-3 times daily.  The sling will need to be removed for pendulum exercises.     Pain medication: Take your pain medicine, as needed and prescribed.  Do not drive or operate machinery while taking narcotic pain medication.   You may start or resume anti-inflammatory medication (i.e. ibuprofen, naproxen) anytime after  surgery, which should be taken with food to avoid stomach irritation.     Problems:     If you are having problems with your shoulder (unexpected pain, excessive bleeding or discharge from your incisions, fevers/chills) do not hesitate to call the office or visit the nearest emergency room for evaluation.     Follow-up:     Make sure that you have an appointment 7-14 days following surgery.  Your stitches will be removed, and your procedure/rehabilitation will be discussed at that time.   Physical therapy may be prescribed at that time.     Catrachita Sanders MD   [facility]   [phone]    Discharge Instructions    Discharged to home by car with relative. Discharged via wheelchair, hospital escort: Yes.  Special equipment needed: Immobilizer    Be sure to schedule a follow-up appointment with your primary care doctor or any specialists as instructed.     Discharge Plan:   Influenza Vaccine Indication: Patient Refuses, Not indicated: Previously immunized this influenza season and > 8 years of age (Had flu shot Nov. 2018)    I understand that a diet low in cholesterol, fat, and sodium is recommended for good health. Unless I have been given specific instructions below for another diet, I accept this instruction as my diet prescription.   Other diet: regular    Special Instructions: Discharge instructions for the Orthopedic Patient    Follow up with Primary Care Physician within 2 weeks of discharge to home, regarding:  Review of medications and diagnostic testing.  Surveillance for medical complications.  Workup and treatment of osteoporosis, if appropriate.     -Is this a Joint Replacement patient? No    -Is this patient being discharged with medication to prevent blood clots?  Yes, Aspirin Aspirin, ASA oral tablets  What is this medicine?  ASPIRIN (AS pir in) is a pain reliever. It is used to treat mild pain and fever. This medicine is also used as directed by a doctor to prevent and to treat heart  attacks, to prevent strokes, and to treat arthritis or inflammation.  This medicine may be used for other purposes; ask your health care provider or pharmacist if you have questions.  COMMON BRAND NAME(S): Aspir-Low, Aspir-Merlene, Aspirtab, Denise Advanced Aspirin, Denise Aspirin, Denise Aspirin Extra Strength, Denise Aspirin Plus, Denise Extra Strength, Denise Extra Strength Plus, Denise Genuine Aspirin, Denise Womens Aspirin, Bufferin, Bufferin Extra Strength, Bufferin Low Dose  What should I tell my health care provider before I take this medicine?  They need to know if you have any of these conditions:  -anemia  -asthma  -bleeding problems  -child with chickenpox, the flu, or other viral infection  -diabetes  -gout  -if you frequently drink alcohol containing drinks  -kidney disease  -liver disease  -low level of vitamin K  -lupus  -smoke tobacco  -stomach ulcers or other problems  -an unusual or allergic reaction to aspirin, tartrazine dye, other medicines, dyes, or preservatives  -pregnant or trying to get pregnant  -breast-feeding  How should I use this medicine?  Take this medicine by mouth with a glass of water. Follow the directions on the package or prescription label. You can take this medicine with or without food. If it upsets your stomach, take it with food. Do not take your medicine more often than directed.  Talk to your pediatrician regarding the use of this medicine in children. While this drug may be prescribed for children as young as 12 years of age for selected conditions, precautions do apply. Children and teenagers should not use this medicine to treat chicken pox or flu symptoms unless directed by a doctor.  Patients over 65 years old may have a stronger reaction and need a smaller dose.  Overdosage: If you think you have taken too much of this medicine contact a poison control center or emergency room at once.  NOTE: This medicine is only for you. Do not share this medicine with others.  What if I  miss a dose?  If you are taking this medicine on a regular schedule and miss a dose, take it as soon as you can. If it is almost time for your next dose, take only that dose. Do not take double or extra doses.  What may interact with this medicine?  Do not take this medicine with any of the following medications:  -cidofovir  -ketorolac  -probenecid  This medicine may also interact with the following medications:  -alcohol  -alendronate  -bismuth subsalicylate  -flavocoxid  -herbal supplements like feverfew, garlic, dyan, ginkgo biloba, horse chestnut  -medicines for diabetes or glaucoma like acetazolamide, methazolamide  -medicines for gout  -medicines that treat or prevent blood clots like enoxaparin, heparin, ticlopidine, warfarin  -other aspirin and aspirin-like medicines  -NSAIDs, medicines for pain and inflammation, like ibuprofen or naproxen  -pemetrexed  -sulfinpyrazone  -varicella live vaccine  This list may not describe all possible interactions. Give your health care provider a list of all the medicines, herbs, non-prescription drugs, or dietary supplements you use. Also tell them if you smoke, drink alcohol, or use illegal drugs. Some items may interact with your medicine.  What should I watch for while using this medicine?  If you are treating yourself for pain, tell your doctor or health care professional if the pain lasts more than 10 days, if it gets worse, or if there is a new or different kind of pain. Tell your doctor if you see redness or swelling. Also, check with your doctor if you have a fever that lasts for more than 3 days. Only take this medicine to prevent heart attacks or blood clotting if prescribed by your doctor or health care professional.  Do not take aspirin or aspirin-like medicines with this medicine. Too much aspirin can be dangerous. Always read the labels carefully.  This medicine can irritate your stomach or cause bleeding problems. Do not smoke cigarettes or drink alcohol  while taking this medicine. Do not lie down for 30 minutes after taking this medicine to prevent irritation to your throat.  If you are scheduled for any medical or dental procedure, tell your healthcare provider that you are taking this medicine. You may need to stop taking this medicine before the procedure.  This medicine may be used to treat migraines. If you take migraine medicines for 10 or more days a month, your migraines may get worse. Keep a diary of headache days and medicine use. Contact your healthcare professional if your migraine attacks occur more frequently.  What side effects may I notice from receiving this medicine?  Side effects that you should report to your doctor or health care professional as soon as possible:  -allergic reactions like skin rash, itching or hives, swelling of the face, lips, or tongue  -breathing problems  -changes in hearing, ringing in the ears  -confusion  -general ill feeling or flu-like symptoms  -pain on swallowing  -redness, blistering, peeling or loosening of the skin, including inside the mouth or nose  -signs and symptoms of bleeding such as bloody or black, tarry stools; red or dark-brown urine; spitting up blood or brown material that looks like coffee grounds; red spots on the skin; unusual bruising or bleeding from the eye, gums, or nose  -trouble passing urine or change in the amount of urine  -unusually weak or tired  -yellowing of the eyes or skin  Side effects that usually do not require medical attention (report to your doctor or health care professional if they continue or are bothersome):  -diarrhea or constipation  -headache  -nausea, vomiting  -stomach gas, heartburn  This list may not describe all possible side effects. Call your doctor for medical advice about side effects. You may report side effects to FDA at 0-472-FDA-2379.  Where should I keep my medicine?  Keep out of the reach of children.  Store at room temperature between 15 and 30 degrees C  (59 and 86 degrees F). Protect from heat and moisture. Do not use this medicine if it has a strong vinegar smell. Throw away any unused medicine after the expiration date.  NOTE: This sheet is a summary. It may not cover all possible information. If you have questions about this medicine, talk to your doctor, pharmacist, or health care provider.  © 2018 [employer]vier/Gold Standard (2014-08-19 11:30:31)      · Is patient discharged on Warfarin / Coumadin?   No     Depression / Suicide Risk    As you are discharged from this [facility] of [facility] facility, it is important to learn how to keep safe from harming yourself.    Recognize the warning signs:  · Abrupt changes in personality, positive or negative- including increase in energy   · Giving away possessions  · Change in eating patterns- significant weight changes-  positive or negative  · Change in sleeping patterns- unable to sleep or sleeping all the time   · Unwillingness or inability to communicate  · Depression  · Unusual sadness, discouragement and loneliness  · Talk of wanting to die  · Neglect of personal appearance   · Rebelliousness- reckless behavior  · Withdrawal from people/activities they love  · Confusion- inability to concentrate     If you or a loved one observes any of these behaviors or has concerns about self-harm, here's what you can do:  · Talk about it- your feelings and reasons for harming yourself  · Remove any means that you might use to hurt yourself (examples: pills, rope, extension cords, firearm)  · Get professional help from the community (Mental Health, Substance Abuse, psychological counseling)  · Do not be alone:Call your Safe Contact- someone whom you trust who will be there for you.  · Call your local CRISIS HOTLINE 572-1342 or 007-859-5018  · Call your local Children's Mobile Crisis Response Team [place] (732) 613-1831 or www.[employer]  · Call the toll free National Suicide Prevention Hotlines   · National Suicide Prevention  Lifeline 959-049-KOEF (5879)  · National [place] Line Network 800-SUICIDE (416-4586)

## 2019-04-10 NOTE — PROGRESS NOTES
Received call from PACU.  Oxygen sats 85-87%.    Plan to admit overnight for supplemental oxygen.    Plan to d/c home in am when able to be weaned off oxygen.

## 2019-04-10 NOTE — PROGRESS NOTES
Pt is a/o x4, able to make needs known. Pt c/o BLE need pain, medicated pt with prn oxycodone 10 mg with good relief. Pt LUE with +CMS, pt states he has some numbness/tingling to left hand. O2 sats > 90% in RA. Encouraged use of IS q hour, pt verbalized understanding.     Patient to be discharged today,  patient aware and agreeable to plan. D/c instructions reviewed with patient questions and concerns answered. Patient educated on pain regimen, s&s of blood clots/infection, and mobility restrictions.

## 2019-04-10 NOTE — THERAPY
Occupational Therapy Evaluation completed.   Functional Status:  63 yo male admit overnight after rotator cuff repair.  Per nursing, MD suggested OT see pt prior to d/c. Orders obtained from Dr. Sanders for ADL training, brace mgmt and pendulum ex education.  Pt was indep prior to surgery but does have significant arthritis in B knees as well as injured shoulder.  Pt was previously dressed from PACU, but OT trained pt and spouse in donning light sweatshirt, donning and adjusting brace. MaxA for UB dressing and brace mgmt.  Pt up walking in room and in reilly ad blayne.  Pt and spouse  educated in detail on ADL's after Shoulder surgery.  Educated on positioning for comfort and humeral support with pillows while sleeping either in bed or recliner, and getting up from bed or recliner while maintaining NWB on surgical arm.  OT adjusted sling for best fit. Educated pt/family on adjusting and removal of sling for dressing and bathing, and to keep sling positioned so hand is level or slightly above elbow to reduce pull of gravity on arm. Educated on use of hand cushion for arm positioning, and for gentle ROM to hand, and also how to move it out of the way to be able to use the hand on the operative side functionally.  Encouraged to support arm with pillows under FA when sitting to reduce strain on the neck from the weight of the arm and immobilizer.  Educated on how to dress UB carefully while maintaining post surgical position, and how to appropriately cover incision for showering if needed prior to removal of staples.  Educated on pendulum exercises verbally and through demo, and that pt cannot begin these until block has worn off, full sensation is restored to the arm, and hand has strong grasp.  Pt educated on proper positioning of arm for gentle elbow, wrist and hand ROM, and informed to keep shoulder in internal rotation during ROM to distal arm.  Pt encouraged to do ROM and exercises 3x/day (when dressing, undressing  "and once mid day after having pain meds, and then to ice after each exercise session). Pt educated on around the clock pain management, even during the night. Pt and spouse verbalized understanding of education provided.  Plan of Care: Patient with no further skilled OT needs in the acute care setting at this time  Discharge Recommendations:  Equipment: No Equipment Needed. Post-acute therapy Discharge to home with outpatient or home health for additional skilled therapy services.    See \"Rehab Therapy-Acute\" Patient Summary Report for complete documentation.    "

## 2019-04-10 NOTE — CARE PLAN
Problem: Safety  Goal: Will remain free from injury  Pt is ambulatory on unit, gait is steady. No unsafe behaviors noted.     Problem: Bowel/Gastric:  Goal: Normal bowel function is maintained or improved  Pt reported having regular bowel movements. No c/o abdominal pain or constipation.

## 2019-04-10 NOTE — CARE PLAN
Problem: Safety  Goal: Will remain free from injury  Outcome: PROGRESSING AS EXPECTED  Patient will be free from injury or fall incident- instruction for use of call light given    Problem: Knowledge Deficit  Goal: Knowledge of disease process/condition, treatment plan, diagnostic tests, and medications will improve  Outcome: PROGRESSING AS EXPECTED  Patient/ family member updated on Plan Of Care and Nurses communications with Doctors.

## 2019-10-23 ENCOUNTER — HOSPITAL ENCOUNTER (OUTPATIENT)
Dept: HOSPITAL 8 - CFH | Age: 64
Discharge: HOME | End: 2019-10-23
Attending: INTERNAL MEDICINE
Payer: MEDICARE

## 2019-10-23 DIAGNOSIS — K76.89: ICD-10-CM

## 2019-10-23 DIAGNOSIS — K76.0: ICD-10-CM

## 2019-10-23 DIAGNOSIS — K44.9: Primary | ICD-10-CM

## 2019-10-23 PROCEDURE — 71250 CT THORAX DX C-: CPT

## 2020-03-25 ENCOUNTER — OFFICE VISIT (OUTPATIENT)
Dept: URGENT CARE | Facility: CLINIC | Age: 65
End: 2020-03-25

## 2020-03-25 ENCOUNTER — HOSPITAL ENCOUNTER (OUTPATIENT)
Facility: MEDICAL CENTER | Age: 65
End: 2020-03-25
Attending: NURSE PRACTITIONER
Payer: COMMERCIAL

## 2020-03-25 ENCOUNTER — NON-PROVIDER VISIT (OUTPATIENT)
Dept: URGENT CARE | Facility: CLINIC | Age: 65
End: 2020-03-25

## 2020-03-25 VITALS
WEIGHT: 244.71 LBS | OXYGEN SATURATION: 96 % | RESPIRATION RATE: 12 BRPM | DIASTOLIC BLOOD PRESSURE: 82 MMHG | HEIGHT: 71 IN | BODY MASS INDEX: 34.26 KG/M2 | SYSTOLIC BLOOD PRESSURE: 112 MMHG | TEMPERATURE: 98.5 F | HEART RATE: 98 BPM

## 2020-03-25 DIAGNOSIS — Z02.1 PRE-EMPLOYMENT DRUG SCREENING: ICD-10-CM

## 2020-03-25 DIAGNOSIS — Z02.1 PRE-EMPLOYMENT DRUG SCREENING: Primary | ICD-10-CM

## 2020-03-25 DIAGNOSIS — Z02.1 PRE-EMPLOYMENT HEALTH SCREENING EXAMINATION: ICD-10-CM

## 2020-03-25 LAB
AMP AMPHETAMINE: NORMAL
BAR BARBITURATES: NORMAL
BZO BENZODIAZEPINES: NORMAL
COC COCAINE: NORMAL
INT CON NEG: NORMAL
INT CON POS: NORMAL
MDMA ECSTASY: NORMAL
MET METHAMPHETAMINES: NORMAL
MTD METHADONE: NORMAL
OPI OPIATES: NORMAL
OXY OXYCODONE: NORMAL
PCP PHENCYCLIDINE: NORMAL
POC URINE DRUG SCREEN OCDRS: NORMAL
THC: NORMAL

## 2020-03-25 PROCEDURE — 86762 RUBELLA ANTIBODY: CPT | Performed by: NURSE PRACTITIONER

## 2020-03-25 PROCEDURE — 86765 RUBEOLA ANTIBODY: CPT | Performed by: NURSE PRACTITIONER

## 2020-03-25 PROCEDURE — 94375 RESPIRATORY FLOW VOLUME LOOP: CPT | Performed by: NURSE PRACTITIONER

## 2020-03-25 PROCEDURE — 80305 DRUG TEST PRSMV DIR OPT OBS: CPT | Performed by: NURSE PRACTITIONER

## 2020-03-25 PROCEDURE — 8915 PR COMPREHENSIVE PHYSICAL: Performed by: NURSE PRACTITIONER

## 2020-03-25 PROCEDURE — 90746 HEPB VACCINE 3 DOSE ADULT IM: CPT | Performed by: PREVENTIVE MEDICINE

## 2020-03-25 PROCEDURE — 86480 TB TEST CELL IMMUN MEASURE: CPT | Performed by: NURSE PRACTITIONER

## 2020-03-25 PROCEDURE — 86735 MUMPS ANTIBODY: CPT | Performed by: NURSE PRACTITIONER

## 2020-03-25 ASSESSMENT — FIBROSIS 4 INDEX: FIB4 SCORE: 0.96

## 2020-03-27 LAB
GAMMA INTERFERON BACKGROUND BLD IA-ACNC: 0.02 IU/ML
M TB IFN-G BLD-IMP: NEGATIVE
M TB IFN-G CD4+ BCKGRND COR BLD-ACNC: 0.08 IU/ML
MEV IGG SER IA-ACNC: 2.58
MITOGEN IGNF BCKGRD COR BLD-ACNC: >10 IU/ML
MUV IGG SER IA-ACNC: 1.43
QFT TB2 - NIL TBQ2: 0.02 IU/ML
RUBV AB SER QL: 101 IU/ML

## 2020-04-15 ENCOUNTER — EH NON-PROVIDER (OUTPATIENT)
Dept: OCCUPATIONAL MEDICINE | Facility: CLINIC | Age: 65
End: 2020-04-15

## 2020-04-15 DIAGNOSIS — Z71.85 IMMUNIZATION COUNSELING: ICD-10-CM

## 2020-08-13 ENCOUNTER — HOSPITAL ENCOUNTER (OUTPATIENT)
Dept: HOSPITAL 8 - CFH | Age: 65
Discharge: HOME | End: 2020-08-13
Attending: INTERNAL MEDICINE
Payer: MEDICARE

## 2020-08-13 DIAGNOSIS — M79.602: ICD-10-CM

## 2020-08-13 DIAGNOSIS — R07.9: ICD-10-CM

## 2020-08-13 DIAGNOSIS — I95.9: Primary | ICD-10-CM

## 2020-08-13 PROCEDURE — 93017 CV STRESS TEST TRACING ONLY: CPT

## 2020-08-13 PROCEDURE — 78452 HT MUSCLE IMAGE SPECT MULT: CPT

## 2020-08-13 PROCEDURE — A9502 TC99M TETROFOSMIN: HCPCS

## 2020-09-24 ENCOUNTER — APPOINTMENT (OUTPATIENT)
Dept: RADIOLOGY | Facility: MEDICAL CENTER | Age: 65
End: 2020-09-24
Attending: EMERGENCY MEDICINE
Payer: MEDICARE

## 2020-09-24 ENCOUNTER — HOSPITAL ENCOUNTER (EMERGENCY)
Facility: MEDICAL CENTER | Age: 65
End: 2020-09-24
Attending: EMERGENCY MEDICINE
Payer: MEDICARE

## 2020-09-24 VITALS
HEART RATE: 64 BPM | RESPIRATION RATE: 16 BRPM | OXYGEN SATURATION: 98 % | TEMPERATURE: 97.1 F | DIASTOLIC BLOOD PRESSURE: 67 MMHG | WEIGHT: 231.48 LBS | SYSTOLIC BLOOD PRESSURE: 115 MMHG | BODY MASS INDEX: 32.41 KG/M2 | HEIGHT: 71 IN

## 2020-09-24 DIAGNOSIS — M79.89 LEG SWELLING: ICD-10-CM

## 2020-09-24 DIAGNOSIS — M25.569 ACUTE KNEE PAIN, UNSPECIFIED LATERALITY: ICD-10-CM

## 2020-09-24 LAB
ALBUMIN SERPL BCP-MCNC: 3.8 G/DL (ref 3.2–4.9)
ALBUMIN/GLOB SERPL: 1.1 G/DL
ALP SERPL-CCNC: 90 U/L (ref 30–99)
ALT SERPL-CCNC: 19 U/L (ref 2–50)
ANION GAP SERPL CALC-SCNC: 12 MMOL/L (ref 7–16)
AST SERPL-CCNC: 16 U/L (ref 12–45)
BASOPHILS # BLD AUTO: 0.6 % (ref 0–1.8)
BASOPHILS # BLD: 0.05 K/UL (ref 0–0.12)
BILIRUB SERPL-MCNC: 0.4 MG/DL (ref 0.1–1.5)
BUN SERPL-MCNC: 22 MG/DL (ref 8–22)
CALCIUM SERPL-MCNC: 9.5 MG/DL (ref 8.4–10.2)
CHLORIDE SERPL-SCNC: 104 MMOL/L (ref 96–112)
CO2 SERPL-SCNC: 26 MMOL/L (ref 20–33)
CREAT SERPL-MCNC: 0.96 MG/DL (ref 0.5–1.4)
CRP SERPL HS-MCNC: 11.03 MG/DL (ref 0–0.75)
EOSINOPHIL # BLD AUTO: 0.33 K/UL (ref 0–0.51)
EOSINOPHIL NFR BLD: 3.8 % (ref 0–6.9)
ERYTHROCYTE [DISTWIDTH] IN BLOOD BY AUTOMATED COUNT: 43.9 FL (ref 35.9–50)
ERYTHROCYTE [SEDIMENTATION RATE] IN BLOOD BY WESTERGREN METHOD: 94 MM/HOUR (ref 0–20)
GLOBULIN SER CALC-MCNC: 3.6 G/DL (ref 1.9–3.5)
GLUCOSE SERPL-MCNC: 91 MG/DL (ref 65–99)
HCT VFR BLD AUTO: 40.4 % (ref 42–52)
HGB BLD-MCNC: 13.8 G/DL (ref 14–18)
IMM GRANULOCYTES # BLD AUTO: 0.03 K/UL (ref 0–0.11)
IMM GRANULOCYTES NFR BLD AUTO: 0.3 % (ref 0–0.9)
LYMPHOCYTES # BLD AUTO: 1.91 K/UL (ref 1–4.8)
LYMPHOCYTES NFR BLD: 22.1 % (ref 22–41)
MCH RBC QN AUTO: 31.7 PG (ref 27–33)
MCHC RBC AUTO-ENTMCNC: 34.2 G/DL (ref 33.7–35.3)
MCV RBC AUTO: 92.9 FL (ref 81.4–97.8)
MONOCYTES # BLD AUTO: 0.66 K/UL (ref 0–0.85)
MONOCYTES NFR BLD AUTO: 7.6 % (ref 0–13.4)
NEUTROPHILS # BLD AUTO: 5.68 K/UL (ref 1.82–7.42)
NEUTROPHILS NFR BLD: 65.6 % (ref 44–72)
NRBC # BLD AUTO: 0 K/UL
NRBC BLD-RTO: 0 /100 WBC
PLATELET # BLD AUTO: 240 K/UL (ref 164–446)
PMV BLD AUTO: 9.6 FL (ref 9–12.9)
POTASSIUM SERPL-SCNC: 4.9 MMOL/L (ref 3.6–5.5)
PROT SERPL-MCNC: 7.4 G/DL (ref 6–8.2)
RBC # BLD AUTO: 4.35 M/UL (ref 4.7–6.1)
SODIUM SERPL-SCNC: 142 MMOL/L (ref 135–145)
WBC # BLD AUTO: 8.7 K/UL (ref 4.8–10.8)

## 2020-09-24 PROCEDURE — 86140 C-REACTIVE PROTEIN: CPT

## 2020-09-24 PROCEDURE — A9270 NON-COVERED ITEM OR SERVICE: HCPCS | Performed by: EMERGENCY MEDICINE

## 2020-09-24 PROCEDURE — 99284 EMERGENCY DEPT VISIT MOD MDM: CPT

## 2020-09-24 PROCEDURE — 93971 EXTREMITY STUDY: CPT | Mod: RT

## 2020-09-24 PROCEDURE — 36415 COLL VENOUS BLD VENIPUNCTURE: CPT

## 2020-09-24 PROCEDURE — 85652 RBC SED RATE AUTOMATED: CPT

## 2020-09-24 PROCEDURE — 700102 HCHG RX REV CODE 250 W/ 637 OVERRIDE(OP): Performed by: EMERGENCY MEDICINE

## 2020-09-24 PROCEDURE — 80053 COMPREHEN METABOLIC PANEL: CPT

## 2020-09-24 PROCEDURE — 85025 COMPLETE CBC W/AUTO DIFF WBC: CPT

## 2020-09-24 RX ORDER — HYDROCODONE BITARTRATE AND ACETAMINOPHEN 10; 325 MG/1; MG/1
2 TABLET ORAL ONCE
Status: COMPLETED | OUTPATIENT
Start: 2020-09-24 | End: 2020-09-24

## 2020-09-24 RX ADMIN — HYDROCODONE BITARTRATE AND ACETAMINOPHEN 1 TABLET: 10; 325 TABLET ORAL at 19:26

## 2020-09-24 ASSESSMENT — FIBROSIS 4 INDEX: FIB4 SCORE: 0.96

## 2020-09-24 NOTE — ED TRIAGE NOTES
"Presents with a hx of right knee arthroscopy this past Monday.  Pt C/O increasing pain, swelling, and redness affecting the extremity since the surgery.  He is referred to our facility by physical therapy to R/O the possibility of a DVT.   Chief Complaint   Patient presents with   • Knee Pain   • Post-Op Complications     /76   Pulse 100   Temp 36.2 °C (97.1 °F) (Temporal)   Resp 18   Ht 1.803 m (5' 11\")   Wt 105 kg (231 lb 7.7 oz)   SpO2 95%   BMI 32.29 kg/m²      "

## 2020-09-25 NOTE — ED NOTES
Discharge instructions given to pt with verbalized understanding.  Pt ambulatory out of the ER using his crutches from home.  Pt's wife to drive him home.

## 2020-09-25 NOTE — ED NOTES
Attempted to pull pain medication from [facility].  Medications not dispensed (witnessed by charge nurse)  Called pharmacy for assistance and they are unable to help.  [facility] called.

## 2020-09-25 NOTE — ED PROVIDER NOTES
ED Provider Note    CHIEF COMPLAINT   Chief Complaint   Patient presents with   • Knee Pain   • Post-Op Complications       HPI   Hrei Delgado is a 65 y.o. male who presents to the ED secondary to right leg swelling.  The patient had surgery approximately 4 days ago out of town, had a total knee replacement.  He is been noticing increasing swelling primarily in the posterior aspect of the leg, denies any fevers runny nose cough congestion, denies any increasing pain, should he talk to his doctor sent him here for a DVT study.  Patient denies any chest pain, shortness of breath.    REVIEW OF SYSTEMS   See HPI for further details. All other systems are negative.     PAST MEDICAL HISTORY   Past Medical History:   Diagnosis Date   • Arthritis     knees and shoulder   • Breath shortness    • Cold     recent hospitalization, cough expiratory wheeze and SOB    • Hypercholesterolemia    • Hypertension    • Pain     both knees lower back   • Pneumonia     3/10/19   • Sleep apnea    • Snoring        FAMILY HISTORY  History reviewed. No pertinent family history.    SOCIAL HISTORY  Social History     Socioeconomic History   • Marital status:      Spouse name: Not on file   • Number of children: Not on file   • Years of education: Not on file   • Highest education level: Not on file   Occupational History   • Not on file   Social Needs   • Financial resource strain: Not on file   • Food insecurity     Worry: Not on file     Inability: Not on file   • Transportation needs     Medical: Not on file     Non-medical: Not on file   Tobacco Use   • Smoking status: Current Every Day Smoker     Packs/day: 1.00     Years: 40.00     Pack years: 40.00     Last attempt to quit: 4/15/2018     Years since quittin.4   • Smokeless tobacco: Never Used   Substance and Sexual Activity   • Alcohol use: Not Currently     Comment: 2/week   • Drug use: No   • Sexual activity: Not on file   Lifestyle   • Physical activity     Days per  "week: Not on file     Minutes per session: Not on file   • Stress: Not on file   Relationships   • Social connections     Talks on phone: Not on file     Gets together: Not on file     Attends Zoroastrianism service: Not on file     Active member of club or organization: Not on file     Attends meetings of clubs or organizations: Not on file     Relationship status: Not on file   • Intimate partner violence     Fear of current or ex partner: Not on file     Emotionally abused: Not on file     Physically abused: Not on file     Forced sexual activity: Not on file   Other Topics Concern   • Not on file   Social History Narrative   • Not on file       SURGICAL HISTORY  Past Surgical History:   Procedure Laterality Date   • PB SHLDR ARTHROSCOP,PART ACROMIOPLAS Left 4/9/2019    Procedure: SHOULDER DECOMPRESSION ARTHROSCOPIC - SUBACROMIAL W/LABRAL DEBRIDEMENT;  Surgeon: Catrachita Sanders M.D.;  Location: [facility];  Service: Orthopedics   • PB SHLDR ARTHROSCOP,SURG,W/ROTAT CUFF REPB Left 4/9/2019    Procedure: SHOULDER ARTHROSCOPY W/ ROTATOR CUFF REPAIR - MESSER;  Surgeon: Catrachita Sanders M.D.;  Location: [facility];  Service: Orthopedics   • BICEPS TENODESIS Left 4/9/2019    Procedure: TENODESIS, BICEPS;  Surgeon: Catrachita Sanders M.D.;  Location: [facility];  Service: Orthopedics   • KNEE ARTHROSCOPY Right 2005   • KNEE ARTHROSCOPY Left 2001   • CHOLECYSTECTOMY  2000   • OTHER ORTHOPEDIC SURGERY         CURRENT MEDICATIONS   Home Medications    **Home medications have not yet been reviewed for this encounter**         ALLERGIES   No Known Allergies    PHYSICAL EXAM  VITAL SIGNS: /67   Pulse 64   Temp 36.2 °C (97.1 °F) (Temporal)   Resp 16   Ht 1.803 m (5' 11\")   Wt 105 kg (231 lb 7.7 oz)   SpO2 98%   BMI 32.29 kg/m²   Constitutional: Well developed, Well nourished, moderate distress, Non-toxic appearance.   HENT:  Atraumatic, Normocephalic, Oral pharynx with moist " mucous membranes.   Eyes: EOMI, PERRL  Cardiovascular: Good Pulses, regular rate and rhythm  Thorax & Lungs: No respiratory distress, clear to auscultation bilaterally  Abdomen: Soft nontender   Skin: Warm, Dry, No erythema, No rash.   Musculoskeletal: Moderate to large amount of swelling throughout the right knee, there is some erythema over the medial aspect progressing posteriorly and up into the groin area, nontender, the patient has incisions that are clean dry and intact.  Neurologic: Alert & oriented x 3,     RADIOLOGY/PROCEDURES  US-EXTREMITY VENOUS LOWER UNILAT RIGHT   Final Result      No evidence of deep venous thrombosis.           Results for orders placed or performed during the hospital encounter of 09/24/20   CBC WITH DIFFERENTIAL   Result Value Ref Range    WBC 8.7 4.8 - 10.8 K/uL    RBC 4.35 (L) 4.70 - 6.10 M/uL    Hemoglobin 13.8 (L) 14.0 - 18.0 g/dL    Hematocrit 40.4 (L) 42.0 - 52.0 %    MCV 92.9 81.4 - 97.8 fL    MCH 31.7 27.0 - 33.0 pg    MCHC 34.2 33.7 - 35.3 g/dL    RDW 43.9 35.9 - 50.0 fL    Platelet Count 240 164 - 446 K/uL    MPV 9.6 9.0 - 12.9 fL    Neutrophils-Polys 65.60 44.00 - 72.00 %    Lymphocytes 22.10 22.00 - 41.00 %    Monocytes 7.60 0.00 - 13.40 %    Eosinophils 3.80 0.00 - 6.90 %    Basophils 0.60 0.00 - 1.80 %    Immature Granulocytes 0.30 0.00 - 0.90 %    Nucleated RBC 0.00 /100 WBC    Neutrophils (Absolute) 5.68 1.82 - 7.42 K/uL    Lymphs (Absolute) 1.91 1.00 - 4.80 K/uL    Monos (Absolute) 0.66 0.00 - 0.85 K/uL    Eos (Absolute) 0.33 0.00 - 0.51 K/uL    Baso (Absolute) 0.05 0.00 - 0.12 K/uL    Immature Granulocytes (abs) 0.03 0.00 - 0.11 K/uL    NRBC (Absolute) 0.00 K/uL   COMP METABOLIC PANEL   Result Value Ref Range    Sodium 142 135 - 145 mmol/L    Potassium 4.9 3.6 - 5.5 mmol/L    Chloride 104 96 - 112 mmol/L    Co2 26 20 - 33 mmol/L    Anion Gap 12.0 7.0 - 16.0    Glucose 91 65 - 99 mg/dL    Bun 22 8 - 22 mg/dL    Creatinine 0.96 0.50 - 1.40 mg/dL    Calcium 9.5 8.4 -  10.2 mg/dL    AST(SGOT) 16 12 - 45 U/L    ALT(SGPT) 19 2 - 50 U/L    Alkaline Phosphatase 90 30 - 99 U/L    Total Bilirubin 0.4 0.1 - 1.5 mg/dL    Albumin 3.8 3.2 - 4.9 g/dL    Total Protein 7.4 6.0 - 8.2 g/dL    Globulin 3.6 (H) 1.9 - 3.5 g/dL    A-G Ratio 1.1 g/dL   Sed Rate   Result Value Ref Range    Sed Rate Westergren 94 (H) 0 - 20 mm/hour   CRP QUANTITIVE (NON-CARDIAC)   Result Value Ref Range    Stat C-Reactive Protein 11.03 (H) 0.00 - 0.75 mg/dL   ESTIMATED GFR   Result Value Ref Range    GFR If African American >60 >60 mL/min/1.73 m 2    GFR If Non African American >60 >60 mL/min/1.73 m 2        COURSE & MEDICAL DECISION MAKING  Pertinent Labs & Imaging studies reviewed. (See chart for details)  Patient is coming in secondary to leg pain and swelling, there is some erythema, will check laboratory test, check a DVT study    DVT was negative, laboratory tests are unremarkable, the patient is not have an elevation of his white blood cell count, does have any increase in his CRP however that is not too surprising given the patient's recent surgery.  I believe the patient can be discharged home, monitor symptoms, keep in contact with the surgeon, have the patient return with increasing pain fevers redness or any other concerns.        FINAL IMPRESSION  1. Acute knee pain, unspecified laterality    2. Leg swelling        Patient referred to primary care provider for blood pressure management     This dictation was created using voice recognition software. The accuracy of the dictation is limited to the abilities of the software. I expect there may be some errors of grammar and possibly content. The nursing notes were reviewed and certain aspects of this information were incorporated into this note.    Electronically signed by: Marvel Hamilton M.D., 9/24/2020 6:22 PM

## 2021-03-03 DIAGNOSIS — Z23 NEED FOR VACCINATION: ICD-10-CM

## 2021-11-21 ENCOUNTER — HOSPITAL ENCOUNTER (EMERGENCY)
Facility: MEDICAL CENTER | Age: 66
End: 2021-11-21
Attending: EMERGENCY MEDICINE
Payer: MEDICARE

## 2021-11-21 VITALS
TEMPERATURE: 97.5 F | RESPIRATION RATE: 14 BRPM | BODY MASS INDEX: 31.78 KG/M2 | HEIGHT: 71 IN | SYSTOLIC BLOOD PRESSURE: 141 MMHG | OXYGEN SATURATION: 97 % | WEIGHT: 227 LBS | HEART RATE: 63 BPM | DIASTOLIC BLOOD PRESSURE: 78 MMHG

## 2021-11-21 DIAGNOSIS — L03.115 CELLULITIS OF RIGHT LOWER EXTREMITY: ICD-10-CM

## 2021-11-21 PROCEDURE — A9270 NON-COVERED ITEM OR SERVICE: HCPCS | Performed by: EMERGENCY MEDICINE

## 2021-11-21 PROCEDURE — 99283 EMERGENCY DEPT VISIT LOW MDM: CPT

## 2021-11-21 PROCEDURE — 700102 HCHG RX REV CODE 250 W/ 637 OVERRIDE(OP): Performed by: EMERGENCY MEDICINE

## 2021-11-21 RX ORDER — SULFAMETHOXAZOLE AND TRIMETHOPRIM 800; 160 MG/1; MG/1
1 TABLET ORAL 2 TIMES DAILY
Qty: 14 TABLET | Refills: 0 | Status: SHIPPED | OUTPATIENT
Start: 2021-11-21

## 2021-11-21 RX ORDER — SULFAMETHOXAZOLE AND TRIMETHOPRIM 800; 160 MG/1; MG/1
1 TABLET ORAL ONCE
Status: COMPLETED | OUTPATIENT
Start: 2021-11-21 | End: 2021-11-21

## 2021-11-21 RX ADMIN — SULFAMETHOXAZOLE AND TRIMETHOPRIM 1 TABLET: 800; 160 TABLET ORAL at 19:58

## 2021-11-21 ASSESSMENT — FIBROSIS 4 INDEX: FIB4 SCORE: 1.02
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
## 2021-11-22 NOTE — ED NOTES
Pt discharged with instructions and 1 e-script.  Pt verbalized understanding of discharge instructions.  Pt advised to return if worse or not improving in 48 hours. Pt ambulated out of ED with wife.

## 2022-07-31 ENCOUNTER — HOSPITAL ENCOUNTER (EMERGENCY)
Facility: MEDICAL CENTER | Age: 67
End: 2022-07-31
Attending: EMERGENCY MEDICINE
Payer: MEDICARE

## 2022-07-31 ENCOUNTER — APPOINTMENT (OUTPATIENT)
Dept: RADIOLOGY | Facility: MEDICAL CENTER | Age: 67
End: 2022-07-31
Attending: EMERGENCY MEDICINE
Payer: MEDICARE

## 2022-07-31 VITALS
RESPIRATION RATE: 20 BRPM | HEIGHT: 71 IN | HEART RATE: 54 BPM | BODY MASS INDEX: 35.31 KG/M2 | OXYGEN SATURATION: 96 % | WEIGHT: 252.21 LBS | TEMPERATURE: 97.6 F | DIASTOLIC BLOOD PRESSURE: 68 MMHG | SYSTOLIC BLOOD PRESSURE: 130 MMHG

## 2022-07-31 DIAGNOSIS — S49.90XA SHOULDER INJURY, UNSPECIFIED LATERALITY, INITIAL ENCOUNTER: ICD-10-CM

## 2022-07-31 PROCEDURE — 700102 HCHG RX REV CODE 250 W/ 637 OVERRIDE(OP): Performed by: EMERGENCY MEDICINE

## 2022-07-31 PROCEDURE — 96376 TX/PRO/DX INJ SAME DRUG ADON: CPT

## 2022-07-31 PROCEDURE — 73030 X-RAY EXAM OF SHOULDER: CPT | Mod: LT

## 2022-07-31 PROCEDURE — 99284 EMERGENCY DEPT VISIT MOD MDM: CPT

## 2022-07-31 PROCEDURE — 96374 THER/PROPH/DIAG INJ IV PUSH: CPT

## 2022-07-31 PROCEDURE — 96375 TX/PRO/DX INJ NEW DRUG ADDON: CPT

## 2022-07-31 PROCEDURE — A9270 NON-COVERED ITEM OR SERVICE: HCPCS | Performed by: EMERGENCY MEDICINE

## 2022-07-31 PROCEDURE — 700111 HCHG RX REV CODE 636 W/ 250 OVERRIDE (IP): Performed by: EMERGENCY MEDICINE

## 2022-07-31 RX ORDER — CYCLOBENZAPRINE HCL 10 MG
10 TABLET ORAL 3 TIMES DAILY PRN
Qty: 30 TABLET | Refills: 0 | Status: SHIPPED | OUTPATIENT
Start: 2022-07-31

## 2022-07-31 RX ORDER — HYDROMORPHONE HYDROCHLORIDE 1 MG/ML
1 INJECTION, SOLUTION INTRAMUSCULAR; INTRAVENOUS; SUBCUTANEOUS ONCE
Status: COMPLETED | OUTPATIENT
Start: 2022-07-31 | End: 2022-07-31

## 2022-07-31 RX ORDER — OXYCODONE HYDROCHLORIDE AND ACETAMINOPHEN 5; 325 MG/1; MG/1
1 TABLET ORAL EVERY 6 HOURS PRN
Qty: 15 TABLET | Refills: 0 | Status: SHIPPED | OUTPATIENT
Start: 2022-07-31 | End: 2022-07-31 | Stop reason: SDUPTHER

## 2022-07-31 RX ORDER — HYDROMORPHONE HYDROCHLORIDE 1 MG/ML
0.5 INJECTION, SOLUTION INTRAMUSCULAR; INTRAVENOUS; SUBCUTANEOUS ONCE
Status: COMPLETED | OUTPATIENT
Start: 2022-07-31 | End: 2022-07-31

## 2022-07-31 RX ORDER — ONDANSETRON 2 MG/ML
4 INJECTION INTRAMUSCULAR; INTRAVENOUS ONCE
Status: COMPLETED | OUTPATIENT
Start: 2022-07-31 | End: 2022-07-31

## 2022-07-31 RX ORDER — OXYCODONE HYDROCHLORIDE AND ACETAMINOPHEN 5; 325 MG/1; MG/1
1 TABLET ORAL EVERY 6 HOURS PRN
Qty: 12 TABLET | Refills: 0 | Status: SHIPPED | OUTPATIENT
Start: 2022-07-31 | End: 2022-08-03

## 2022-07-31 RX ORDER — CYCLOBENZAPRINE HCL 10 MG
10 TABLET ORAL ONCE
Status: COMPLETED | OUTPATIENT
Start: 2022-07-31 | End: 2022-07-31

## 2022-07-31 RX ORDER — IBUPROFEN 800 MG/1
800 TABLET ORAL EVERY 8 HOURS PRN
Qty: 30 TABLET | Refills: 0 | Status: SHIPPED | OUTPATIENT
Start: 2022-07-31

## 2022-07-31 RX ORDER — CYCLOBENZAPRINE HCL 10 MG
10 TABLET ORAL 3 TIMES DAILY PRN
Qty: 30 TABLET | Refills: 0 | Status: SHIPPED | OUTPATIENT
Start: 2022-07-31 | End: 2022-07-31 | Stop reason: SDUPTHER

## 2022-07-31 RX ORDER — IBUPROFEN 800 MG/1
800 TABLET ORAL EVERY 8 HOURS PRN
Qty: 30 TABLET | Refills: 0 | Status: SHIPPED | OUTPATIENT
Start: 2022-07-31 | End: 2022-07-31 | Stop reason: SDUPTHER

## 2022-07-31 RX ADMIN — CYCLOBENZAPRINE 10 MG: 10 TABLET, FILM COATED ORAL at 06:27

## 2022-07-31 RX ADMIN — HYDROMORPHONE HYDROCHLORIDE 1 MG: 1 INJECTION, SOLUTION INTRAMUSCULAR; INTRAVENOUS; SUBCUTANEOUS at 06:27

## 2022-07-31 RX ADMIN — ONDANSETRON 4 MG: 2 INJECTION INTRAMUSCULAR; INTRAVENOUS at 06:27

## 2022-07-31 RX ADMIN — HYDROMORPHONE HYDROCHLORIDE 0.5 MG: 1 INJECTION, SOLUTION INTRAMUSCULAR; INTRAVENOUS; SUBCUTANEOUS at 07:21

## 2022-07-31 ASSESSMENT — PAIN DESCRIPTION - PAIN TYPE
TYPE: ACUTE PAIN;CHRONIC PAIN
TYPE: ACUTE PAIN;CHRONIC PAIN

## 2022-07-31 ASSESSMENT — FIBROSIS 4 INDEX: FIB4 SCORE: 1.04

## 2022-07-31 NOTE — ED NOTES
Pt presents to ED for left shoulder pain. Patient denies chest pain at this time. Patient states that he injured his shoulder lifting 6 wks prior to ED visit and his pain has progressively gotten worse. Pt states he took 2 advil at 5 AM with no relief

## 2022-07-31 NOTE — ED PROVIDER NOTES
"ED Provider Note    CHIEF COMPLAINT  Chief Complaint   Patient presents with   • Shoulder Injury     Pt reports having L rotator cuff sx 2 years ago. Pt reports lifting a cement statue and having increasing pain to the L shoulder since. He states that the pain woke him up from sleep tonight. \"It feels like something is locked up..stiff.\" Pt is holding his LUE elevated to decrease the pain. Sensation and pulses are WNL to the LUE. Pt is diaphoretic from the pain.        HPI  Heri Delgado is a 67 y.o. male here for evaluation of left shoulder pain. The pt states he has a previous rotator cuff injury, from a couple years ago.  He states that about 6 weeks ago, he attempted to lift something heavy, and felt a pain.  He has had pain since that time. He states it has been 6 week, his pain is worse at night, and he only 'sleeps about four hours at a time.'  He has pain with movement of the arm, and lifting the arm.  No re injury, but last night, the pain became intense after he moved a 'certain way.'  No fever chills, no cp, no sob, no vomiting. The pain stays in the left back of his neck, with radiation down to his 'deltoid.'     ROS;  Please see HPI  O/W negative     PAST MEDICAL HISTORY   has a past medical history of Arthritis, Breath shortness, Cold, Hypercholesterolemia, Hypertension, Pain, Pneumonia, Sleep apnea, and Snoring.    SOCIAL HISTORY  Social History     Tobacco Use   • Smoking status: Current Every Day Smoker     Packs/day: 1.00     Years: 40.00     Pack years: 40.00     Last attempt to quit: 4/15/2018     Years since quittin.2   • Smokeless tobacco: Never Used   Substance and Sexual Activity   • Alcohol use: Not Currently     Comment: Occasionally   • Drug use: No   • Sexual activity: Not on file       SURGICAL HISTORY   has a past surgical history that includes other orthopedic surgery; knee arthroscopy (Left, ); knee arthroscopy (Right, ); cholecystectomy (); shldr arthroscop,part " "acromioplas (Left, 4/9/2019); shldr arthroscop,surg,w/rotat cuff repr (Left, 4/9/2019); and biceps tenodesis (Left, 4/9/2019).    CURRENT MEDICATIONS  Home Medications    **Home medications have not yet been reviewed for this encounter**         ALLERGIES  No Known Allergies    REVIEW OF SYSTEMS  See HPI for further details. Review of systems as above, otherwise all other systems are negative.     PHYSICAL EXAM  VITAL SIGNS: /89   Pulse 81   Temp 36.4 °C (97.5 °F) (Temporal)   Resp 20   Ht 1.803 m (5' 11\")   Wt 114 kg (252 lb 3.3 oz)   SpO2 97%   BMI 35.18 kg/m²     Constitutional: Well developed, well nourished. moderate acute distress.  HEENT: Normocephalic, atraumatic. MMM  Neck: Supple, Full range of motion   Chest/Pulmonary:  No respiratory distress.  Equal expansion   Musculoskeletal: No deformity, no edema, neurovascular intact distally.  Left anterior shoulder tenderness, at the bicipital groove, extending around to the posterior trapezius.  Non tender left elbow.   Neuro: Clear speech, appropriate, cooperative, cranial nerves II-XII grossly intact.  Psych: anxious mood and affect      DX-SHOULDER 2+ LEFT   Final Result      1.  Postoperative left shoulder.   2.  No acute fracture or dislocation.   3.  Minor degenerative change of the acromioclavicular joint.              PROCEDURES     MEDICAL RECORD  I have reviewed patient's medical record and pertinent results are listed.    COURSE & MEDICAL DECISION MAKING  I have reviewed any medical record information, laboratory studies and radiographic results as noted above.    10:20 AM  I spoke to [facility] pharmacy.  The pt had a norco script filled in mid July, but I told the pt to take the percocet for a few days, as it is stronger than the norco.  He can resume his norco as directed, but is aware not to take them together.  I feel  He may need some stronger pain relief for his shoulder.     The patient has a known rotator cuff injury, and follows up " with orthopedics for the same.  He has been given pain medications with relief, of some of his symptoms.  The patient states that he has a follow-up appointment with his orthopedist next week.  He has an injury that was noted from 6 weeks ago, but without any recent injury over the last few days.  I will have him follow-up, or return here for any further issues or concerns.    I you have had any blood pressure issues while here in the emergency department, please see your doctor for a further evaluation or work up.    Differential diagnoses include but not limited to: Rotator cuff injury, fracture, strain    This patient presents with left shoulder pain .  At this time, I have counseled the patient/family regarding their medications, pain control, and follow up.  They will continue their medications, if any, as prescribed.  They will return immediately for any worsening symptoms and/or any other medical concerns.  They will see their doctor, or contact the doctor provided, in 1-2 days for follow up.       FINAL IMPRESSION  Left shoulder pain, chronic      Electronically signed by: Huan Smith D.O., 7/31/2022 6:28 AM

## 2022-07-31 NOTE — ED TRIAGE NOTES
"Chief Complaint   Patient presents with   • Shoulder Injury     Pt reports having L rotator cuff sx 2 years ago. Pt reports lifting a cement statue and having increasing pain to the L shoulder since. He states that the pain woke him up from sleep tonight. \"It feels like something is locked up..stiff.\" Pt is holding his LUE elevated to decrease the pain. Sensation and pulses are WNL to the LUE. Pt is diaphoretic from the pain.      Blood Pressure 139/89   Pulse 81   Temperature 36.4 °C (97.5 °F) (Temporal)   Respiration 20   Height 1.803 m (5' 11\")   Weight 114 kg (252 lb 3.3 oz)   Oxygen Saturation 97%   Body Mass Index 35.18 kg/m²     "

## 2022-08-01 ENCOUNTER — HOSPITAL ENCOUNTER (OUTPATIENT)
Dept: RADIOLOGY | Facility: MEDICAL CENTER | Age: 67
End: 2022-08-01
Attending: FAMILY MEDICINE
Payer: MEDICARE

## 2022-08-01 DIAGNOSIS — M25.512 LEFT SHOULDER PAIN, UNSPECIFIED CHRONICITY: ICD-10-CM

## 2022-08-01 PROCEDURE — 73221 MRI JOINT UPR EXTREM W/O DYE: CPT | Mod: LT,MH

## 2022-08-02 ENCOUNTER — HOSPITAL ENCOUNTER (OUTPATIENT)
Dept: RADIOLOGY | Facility: MEDICAL CENTER | Age: 67
End: 2022-08-02
Attending: FAMILY MEDICINE
Payer: MEDICARE

## 2022-08-02 DIAGNOSIS — M54.12 RADICULOPATHY OF CERVICAL SPINE: ICD-10-CM

## 2022-08-02 PROCEDURE — 72141 MRI NECK SPINE W/O DYE: CPT | Mod: MH

## 2022-08-07 ENCOUNTER — HOSPITAL ENCOUNTER (EMERGENCY)
Facility: MEDICAL CENTER | Age: 67
End: 2022-08-07
Attending: EMERGENCY MEDICINE
Payer: MEDICARE

## 2022-08-07 VITALS
OXYGEN SATURATION: 93 % | BODY MASS INDEX: 35.22 KG/M2 | WEIGHT: 251.54 LBS | SYSTOLIC BLOOD PRESSURE: 129 MMHG | TEMPERATURE: 97.3 F | RESPIRATION RATE: 18 BRPM | HEART RATE: 55 BPM | HEIGHT: 71 IN | DIASTOLIC BLOOD PRESSURE: 79 MMHG

## 2022-08-07 DIAGNOSIS — M79.2 RADICULAR PAIN IN LEFT ARM: ICD-10-CM

## 2022-08-07 PROCEDURE — 99283 EMERGENCY DEPT VISIT LOW MDM: CPT

## 2022-08-07 PROCEDURE — A9270 NON-COVERED ITEM OR SERVICE: HCPCS | Performed by: EMERGENCY MEDICINE

## 2022-08-07 PROCEDURE — 700102 HCHG RX REV CODE 250 W/ 637 OVERRIDE(OP): Performed by: EMERGENCY MEDICINE

## 2022-08-07 RX ORDER — OXYCODONE AND ACETAMINOPHEN 7.5; 325 MG/1; MG/1
1 TABLET ORAL EVERY 6 HOURS PRN
Qty: 15 TABLET | Refills: 0 | Status: SHIPPED | OUTPATIENT
Start: 2022-08-07 | End: 2022-08-11

## 2022-08-07 RX ORDER — OXYCODONE AND ACETAMINOPHEN 10; 325 MG/1; MG/1
1 TABLET ORAL ONCE
Status: COMPLETED | OUTPATIENT
Start: 2022-08-07 | End: 2022-08-07

## 2022-08-07 RX ORDER — OXYCODONE AND ACETAMINOPHEN 7.5; 325 MG/1; MG/1
1 TABLET ORAL EVERY 6 HOURS PRN
Qty: 10 TABLET | Refills: 0 | Status: CANCELLED | OUTPATIENT
Start: 2022-08-07 | End: 2022-08-11

## 2022-08-07 RX ADMIN — OXYCODONE AND ACETAMINOPHEN 1 TABLET: 10; 325 TABLET ORAL at 13:11

## 2022-08-07 ASSESSMENT — PAIN DESCRIPTION - PAIN TYPE: TYPE: ACUTE PAIN

## 2022-08-07 ASSESSMENT — FIBROSIS 4 INDEX: FIB4 SCORE: 1.04

## 2022-08-07 NOTE — DISCHARGE INSTRUCTIONS
Take Percocet instead of hydrocodone for pain.  Call Dr. Barton or go directly to his office tomorrow to make a follow-up appointment.  Return to the ER for fever neurologic weakness or bowel or bladder issues.

## 2022-08-07 NOTE — ED TRIAGE NOTES
Left shoulder pain. Here one week ago and found out it is his neck. Had injections a few days ago but not working.

## 2022-08-07 NOTE — ED NOTES
Patient is stable for discharge at this time, anticipatory guidance provided, close follow-up is encouraged, and ED return instructions have been detailed. Patient is both agreeable to the disposition and plan and discharged home in ambulatory state.

## 2022-08-07 NOTE — ED PROVIDER NOTES
"ED Provider Note    Scribed for Dandy Heaton M.D. by Anuel Monge. 8/7/2022  12:45 PM    Primary care provider: Arsenio Sarkar M.D.  Means of arrival: Walk-in  History obtained from: Patient  History limited by: None     CHIEF COMPLAINT  Chief Complaint   Patient presents with   • Shoulder Pain     Left shoulder pain and lifting heavy objects recently. Shoulder surgery in past. Here last week and neck DDD.        HPI  Heri Delgado is a 67 y.o. male who presents to the Emergency Department for evaluation of constant left shoulder pain, onset last week. He describes the pain as a deep stabbing pain, and rates it as a 3/10 when in a comfortable position. However, he notes that his pain is exacerbated when he turns his head to his left side and rates it as an 8/10 when he does this. He also notes that the pain has remained consistent, and has not gotten worse over the past week. He has a history of cervical stenosis, he was diagnosed 3-4 years ago. He also has a history of left rotator cuff repair. He was informed by Neurosurgeon he would need surgery due to his stenosis, however, he did not want it at the time of diagnoses. Last week he was doing house work and was lifting a heavy statue. The next day when he woke up he had significant pain in his left shoulder. He presented to his PCP who ordered an MRI of the left shoulder and neck. His shoulder MRI was normal, however he notes the neck MRI was \"pretty severe\". Following this his PCP sent him to [facility] to receive an epidural, which temporarily relieved his pain. He also notes that he has previously taken flexeril for pain, but stopped due to becoming constipated. He also recently stopped taking ibuprofen as he was concerned this would hinder him form receiving surgery. He is currently taking 5 mg norco 5-6 times daily, to mild alleviation. He has associated symptoms pf numbness in his left 3rd-5th fingers. He denies any fever, cough, " vomiting, diarrhea, headache, incontinence, lower extremity weakness, or recent head injury. He also denies an injection drug or medication usage. He has tried to contact his Neurosurgeon, Dr. Barton, but has not gotten a response.       REVIEW OF SYSTEMS  Pertinent positives include: left shoulder pain and numbness to the left 3rd-5th fingers.  Pertinent negatives include: fever, cough, vomiting, diarrhea, headache, incontinence, lower extremity weakness, or recent head injury.  See HPI for further details.     PAST MEDICAL HISTORY  Past Medical History:   Diagnosis Date   • Arthritis     knees and shoulder   • Breath shortness    • Cold     recent hospitalization, cough expiratory wheeze and SOB    • Hypercholesterolemia    • Hypertension    • Pain     both knees lower back   • Pneumonia     3/10/19   • Sleep apnea    • Snoring        FAMILY HISTORY  History reviewed. No pertinent family history.    SOCIAL HISTORY  Social History     Tobacco Use   • Smoking status: Current Every Day Smoker     Packs/day: 1.00     Years: 40.00     Pack years: 40.00     Last attempt to quit: 4/15/2018     Years since quittin.3   • Smokeless tobacco: Never Used   Vaping Use   • Vaping Use: Never used   Substance Use Topics   • Alcohol use: Not Currently     Comment: Occasionally   • Drug use: No     Social History     Substance and Sexual Activity   Drug Use No       SURGICAL HISTORY  Past Surgical History:   Procedure Laterality Date   • OR SHLDR ARTHROSCOP,PART ACROMIOPLAS Left 2019    Procedure: SHOULDER DECOMPRESSION ARTHROSCOPIC - SUBACROMIAL W/LABRAL DEBRIDEMENT;  Surgeon: Catrachita Sanders M.D.;  Location: SURGERY [facility];  Service: Orthopedics   • PB SHLDR ARTHROSCOP,SURG,W/ROTAT CUFF REPB Left 2019    Procedure: SHOULDER ARTHROSCOPY W/ ROTATOR CUFF REPAIR - MESSER;  Surgeon: Catrachita Sanders M.D.;  Location: SURGERY [facility];  Service: Orthopedics   • BICEPS TENODESIS Left 2019    Procedure:  "TENODESIS, BICEPS;  Surgeon: Catrachita Sanders M.D.;  Location: SURGERY [facility];  Service: Orthopedics   • KNEE ARTHROSCOPY Right 2005   • KNEE ARTHROSCOPY Left 2001   • CHOLECYSTECTOMY  2000   • OTHER ORTHOPEDIC SURGERY         CURRENT MEDICATIONS  Current Outpatient Medications   Medication Instructions   • albuterol 108 (90 Base) MCG/ACT Aero Soln inhalation aerosol 2 Puffs, Inhalation, EVERY 6 HOURS PRN   • aspirin (ASA) 325 mg, Oral, DAILY   • Coenzyme Q10 (CO Q 10 PO) 1 Capsule, Oral, EVERY EVENING   • cyclobenzaprine (FLEXERIL) 10 mg, Oral, 3 TIMES DAILY PRN   • hydrocodone/acetaminophen (NORCO)  MG TABS 1 Tablet, Oral, 4 TIMES DAILY   • ibuprofen (MOTRIN) 800 mg, Oral, DAILY   • ibuprofen (MOTRIN) 800 mg, Oral, EVERY 8 HOURS PRN   • KRILL OIL PO 1 Capsule, Oral, EVERY EVENING   • losartan (COZAAR) 50 mg, Oral, DAILY   • simvastatin (ZOCOR) 20 mg, Oral, NIGHTLY   • sulfamethoxazole-trimethoprim (BACTRIM DS) 800-160 MG tablet 1 Tablet, Oral, 2 TIMES DAILY         ALLERGIES  No Known Allergies    PHYSICAL EXAM  VITAL SIGNS: BP (!) 148/99   Pulse 69   Temp 36.6 °C (97.9 °F) (Temporal)   Resp 16   Ht 1.803 m (5' 11\")   Wt 114 kg (251 lb 8.7 oz)   SpO2 95%   BMI 35.08 kg/m²    Pulse ox interpretation: Elevated blood pressure, afebrile  Constitutional :  Well developed, Well nourished, No acute distress, Non-toxic appearance. Holding his left arm over his head.  Neck: Normal range of motion, No tenderness of the midline or paraspinous musculature.   Cardiovascular: Normal heart rate, Normal rhythm, No murmurs, No rubs, No gallops.   Skin: Warm, Dry, No erythema, No rash.   Musculoskeletal: No clavicle or glenohumeral tenderness.  The patient is holding his arm in extreme of abduction and as if there was an inferior dislocation but he can range the shoulder normally.  Neurologic: Grasp, biceps, triceps, finger abduction, thumb opposition intact bilaterally.      DIFFERENTIAL DIAGNOSIS:  Spinal " stenosis, Paraspinal stenosis, Rotator cuff tear, Disc herniation.    INTERVENTIONS:  Medications   oxyCODONE-acetaminophen (PERCOCET-10)  MG per tablet 1 Tablet (1 Tablet Oral Given 8/7/22 1311)     Response: improved pain.    ED COURSE:  Nursing notes, VS, PMSFHx reviewed in chart.     12:45 PM - Patient seen and examined at bedside. Patient will be treated with oxycodone  mg tablet, 1 tablet for his symptoms.     1:15 PM I reevaluated the patient at bedside. I discussed the patient's diagnostic study results. I discussed plan for discharge and follow up as outlined below. The patient is stable for discharge at this time and will return for any new or worsening symptoms. Patient verbalizes understanding and support with my plan for discharge.     Case discussed with Dr. Dawn neurosurgery will notify the  tomorrow to call the patient to give him an appointment.    MEDICAL DECISION MAKING:  This patient presents with a severe radicular pain radiating to the left shoulder.  He has had x-rays of the shoulder and MRI of the cervical spine and shoulder.  He has mild central canal narrowing and multiple level foraminal stenosis.  He has failed 2 steroid courses and an epidural injection.  He is having inadequate pain relief with his hydrocodone.  He had better control last week with a couple of days of oxycodone Tylenol.  He cannot reach Dr. Barton's clinic staff to schedule an appointment.    PLAN:  Percocet 7.5 mg #15  Prescription monitoring queried and score 430  Opiate risk tool utilized and patient low risk  Informed consent obtained for opiate analgesic  Indication opiate analgesic failure of hydrocodone to control severe radicular pain    I have signed into and reviewed the patient's prescription monitoring program data prior to prescribing a scheduled drug.    Take Percocet instead of hydrocodone for pain.  Call Dr. Barton or go directly to his office tomorrow to make a follow-up  appointment.      Return for fever neurologic weakness or bowel or bladder issues.      Parish Barton M.D.  [address]  [place] [id number] [phone]    Call   tomorrow      CONDITION: good.     FINAL IMPRESSION  1. Radicular pain in left arm    2.      Cervical spine arthritis     Anuel ALCALA (Scribe), am scribing for, and in the presence of, Dandy Heaton M.D..    Electronically signed by: Anuel Monge (Scribe), 8/7/2022    Dandy ALCALA M.D. personally performed the services described in this documentation, as scribed by Anuel Monge in my presence, and it is both accurate and complete.    The note accurately reflects work and decisions made by me.  Dandy Heaton M.D.  8/7/2022  5:59 PM

## 2022-11-08 ENCOUNTER — PATIENT MESSAGE (OUTPATIENT)
Dept: HEALTH INFORMATION MANAGEMENT | Facility: OTHER | Age: 67
End: 2022-11-08

## 2023-10-04 ENCOUNTER — HOSPITAL ENCOUNTER (OUTPATIENT)
Dept: LAB | Facility: MEDICAL CENTER | Age: 68
End: 2023-10-04
Attending: ORTHOPAEDIC SURGERY
Payer: MEDICARE

## 2023-10-04 LAB
ANION GAP SERPL CALC-SCNC: 10 MMOL/L (ref 7–16)
BASOPHILS # BLD AUTO: 1.3 % (ref 0–1.8)
BASOPHILS # BLD: 0.07 K/UL (ref 0–0.12)
BUN SERPL-MCNC: 21 MG/DL (ref 8–22)
CALCIUM SERPL-MCNC: 9.5 MG/DL (ref 8.5–10.5)
CHLORIDE SERPL-SCNC: 105 MMOL/L (ref 96–112)
CO2 SERPL-SCNC: 25 MMOL/L (ref 20–33)
CREAT SERPL-MCNC: 1.07 MG/DL (ref 0.5–1.4)
EOSINOPHIL # BLD AUTO: 0.25 K/UL (ref 0–0.51)
EOSINOPHIL NFR BLD: 4.5 % (ref 0–6.9)
ERYTHROCYTE [DISTWIDTH] IN BLOOD BY AUTOMATED COUNT: 45.9 FL (ref 35.9–50)
GFR SERPLBLD CREATININE-BSD FMLA CKD-EPI: 75 ML/MIN/1.73 M 2
GLUCOSE SERPL-MCNC: 110 MG/DL (ref 65–99)
HCT VFR BLD AUTO: 44.1 % (ref 42–52)
HGB BLD-MCNC: 14.8 G/DL (ref 14–18)
IMM GRANULOCYTES # BLD AUTO: 0.03 K/UL (ref 0–0.11)
IMM GRANULOCYTES NFR BLD AUTO: 0.5 % (ref 0–0.9)
LYMPHOCYTES # BLD AUTO: 1.99 K/UL (ref 1–4.8)
LYMPHOCYTES NFR BLD: 35.7 % (ref 22–41)
MCH RBC QN AUTO: 31 PG (ref 27–33)
MCHC RBC AUTO-ENTMCNC: 33.6 G/DL (ref 32.3–36.5)
MCV RBC AUTO: 92.5 FL (ref 81.4–97.8)
MONOCYTES # BLD AUTO: 0.43 K/UL (ref 0–0.85)
MONOCYTES NFR BLD AUTO: 7.7 % (ref 0–13.4)
NEUTROPHILS # BLD AUTO: 2.81 K/UL (ref 1.82–7.42)
NEUTROPHILS NFR BLD: 50.3 % (ref 44–72)
NRBC # BLD AUTO: 0 K/UL
NRBC BLD-RTO: 0 /100 WBC (ref 0–0.2)
PLATELET # BLD AUTO: 286 K/UL (ref 164–446)
PMV BLD AUTO: 9.9 FL (ref 9–12.9)
POTASSIUM SERPL-SCNC: 5.1 MMOL/L (ref 3.6–5.5)
RBC # BLD AUTO: 4.77 M/UL (ref 4.7–6.1)
SODIUM SERPL-SCNC: 140 MMOL/L (ref 135–145)
WBC # BLD AUTO: 5.6 K/UL (ref 4.8–10.8)

## 2023-10-04 PROCEDURE — 80048 BASIC METABOLIC PNL TOTAL CA: CPT

## 2023-10-04 PROCEDURE — 36415 COLL VENOUS BLD VENIPUNCTURE: CPT

## 2023-10-04 PROCEDURE — 85025 COMPLETE CBC W/AUTO DIFF WBC: CPT

## 2024-11-25 ENCOUNTER — RESEARCH ENCOUNTER (OUTPATIENT)
Dept: RESEARCH | Facility: MEDICAL CENTER | Age: 69
End: 2024-11-25
Payer: MEDICARE

## 2024-11-25 DIAGNOSIS — Z00.6 CLINICAL TRIAL PARTICIPANT: ICD-10-CM

## 2024-12-10 ENCOUNTER — APPOINTMENT (OUTPATIENT)
Dept: LAB | Facility: MEDICAL CENTER | Age: 69
End: 2024-12-10
Attending: FAMILY MEDICINE

## 2025-08-11 ENCOUNTER — HOSPITAL ENCOUNTER (EMERGENCY)
Facility: MEDICAL CENTER | Age: 70
End: 2025-08-11
Attending: EMERGENCY MEDICINE
Payer: COMMERCIAL

## 2025-08-11 ENCOUNTER — APPOINTMENT (OUTPATIENT)
Dept: RADIOLOGY | Facility: MEDICAL CENTER | Age: 70
End: 2025-08-11
Attending: EMERGENCY MEDICINE
Payer: COMMERCIAL

## 2025-08-11 VITALS
HEART RATE: 65 BPM | RESPIRATION RATE: 14 BRPM | BODY MASS INDEX: 36.54 KG/M2 | DIASTOLIC BLOOD PRESSURE: 80 MMHG | SYSTOLIC BLOOD PRESSURE: 138 MMHG | HEIGHT: 71 IN | TEMPERATURE: 97 F | OXYGEN SATURATION: 95 % | WEIGHT: 261.02 LBS

## 2025-08-11 DIAGNOSIS — S89.91XA INJURY OF RIGHT LOWER EXTREMITY, INITIAL ENCOUNTER: Primary | ICD-10-CM

## 2025-08-11 DIAGNOSIS — S81.811A SKIN TEAR OF RIGHT LOWER LEG WITHOUT COMPLICATION, INITIAL ENCOUNTER: ICD-10-CM

## 2025-08-11 PROCEDURE — 99284 EMERGENCY DEPT VISIT MOD MDM: CPT

## 2025-08-11 PROCEDURE — 304217 HCHG IRRIGATION SYSTEM

## 2025-08-11 PROCEDURE — 73590 X-RAY EXAM OF LOWER LEG: CPT | Mod: RT

## 2025-08-11 RX ORDER — CEPHALEXIN 500 MG/1
500 CAPSULE ORAL 4 TIMES DAILY
Qty: 20 CAPSULE | Refills: 0 | Status: ACTIVE | OUTPATIENT
Start: 2025-08-11 | End: 2025-08-16

## (undated) DEVICE — TUBING PUMP WITH CONNECTOR REDEUCE (1EA)

## (undated) DEVICE — CANNULA THREADED 5X75 (5EA/BX)

## (undated) DEVICE — GOWN SURGICAL X-LARGE ULTRA - FILM-REINFORCED (20/CA)

## (undated) DEVICE — PROTECTOR ULNA NERVE - (36PR/CA)

## (undated) DEVICE — SYRINGE ASEPTO - (50EA/CA

## (undated) DEVICE — NEEDLE W/FACET TIP DULL VERSION W/STIMULATION CABLE SONOPLEX 21G X 4 (10/EA)"

## (undated) DEVICE — HEAD HOLDER JUNIOR/ADULT

## (undated) DEVICE — NEPTUNE 4 PORT MANIFOLD - (20/PK)

## (undated) DEVICE — SENSOR SPO2 NEO LNCS ADHESIVE (20/BX) SEE USER NOTES

## (undated) DEVICE — ELECTRODE ACROMIOPLASTY CNCPT (5/BX)

## (undated) DEVICE — SODIUM CHL IRRIGATION 0.9% 1000ML (12EA/CA)

## (undated) DEVICE — GLOVE BIOGEL SZ 8 SURGICAL PF LTX - (50PR/BX 4BX/CA)

## (undated) DEVICE — BAG, SPONGE COUNT 50600

## (undated) DEVICE — CANISTER SUCTION RIGID RED 1500CC (40EA/CA)

## (undated) DEVICE — GLOVE, LITE (PAIR)

## (undated) DEVICE — TAPE CLOTH MEDIPORE 6 INCH - (12RL/CA)

## (undated) DEVICE — MASK ANESTHESIA ADULT  - (100/CA)

## (undated) DEVICE — GLOVE BIOGEL INDICATOR SZ 8 SURGICAL PF LTX - (50/BX 4BX/CA)

## (undated) DEVICE — KIT ANESTHESIA W/CIRCUIT & 3/LT BAG W/FILTER (20EA/CA)

## (undated) DEVICE — HUMID-VENT HEAT AND MOISTURE EXCHANGE- (50/BX)

## (undated) DEVICE — SPONGE GAUZESTER 4 X 4 4PLY - (128PK/CA)

## (undated) DEVICE — ELECTRODE 850 FOAM ADHESIVE - HYDROGEL RADIOTRNSPRNT (50/PK)

## (undated) DEVICE — BLOCK

## (undated) DEVICE — TAPE XBRAID TT 2.0MM (12EA/BX)

## (undated) DEVICE — DRESSING ABDOMINAL PAD STERILE 8 X 10" (360EA/CA)"

## (undated) DEVICE — DRESSING XEROFORM 1X8 - (50/BX 4BX/CA)

## (undated) DEVICE — ELECTRODE DUAL RETURN W/ CORD - (50/PK)

## (undated) DEVICE — DRAPE IOBAN II INCISE 23X17 - (10EA/BX 4BX/CA)

## (undated) DEVICE — SUTURE GENERAL

## (undated) DEVICE — WATER IRRIGATION STERILE 1000ML (12EA/CA)

## (undated) DEVICE — CANNULA FULLY THREADED 8 X 75 (5EA/BX)

## (undated) DEVICE — GLOVE BIOGEL INDICATOR SZ 7SURGICAL PF LTX - (50/BX 4BX/CA)

## (undated) DEVICE — DRAPE U SPLIT IMP 54 X 76 - (24/CA)

## (undated) DEVICE — GLOVE 7.0 LF PF PROTEXIS (50PR/BX)

## (undated) DEVICE — SHAVER, 5.5 RESECTOR

## (undated) DEVICE — SLEEVE SHOULDER DISP(ARTHREX) - (6/BX)

## (undated) DEVICE — GLOVE BIOGEL SZ 7.5 SURGICAL PF LTX - (50PR/BX 4BX/CA)

## (undated) DEVICE — TUBE CONNECTING SUCTION - CLEAR PLASTIC STERILE 72 IN (50EA/CA)

## (undated) DEVICE — ABLATOR WAND SERFAS 90-S CRUISE

## (undated) DEVICE — CHLORAPREP 26 ML APPLICATOR - ORANGE TINT(25/CA)

## (undated) DEVICE — SUCTION INSTRUMENT YANKAUER BULBOUS TIP W/O VENT (50EA/CA)

## (undated) DEVICE — KIT ROOM DECONTAMINATION

## (undated) DEVICE — GOWN WARMING STANDARD FLEX - (30/CA)

## (undated) DEVICE — SODIUM CHL. IRRIGATION 0.9% 3000ML (4EA/CA 65CA/PF)

## (undated) DEVICE — TUBING PATIENT W/CONNECTOR REDEUCE (1EA)

## (undated) DEVICE — TUBE E-T HI-LO CUFF 7.0MM (10EA/PK)

## (undated) DEVICE — GLOVE BIOGEL SZ 7 SURGICAL PF LTX - (50PR/BX 4BX/CA)

## (undated) DEVICE — SUTURE 3-0 ETHILON FS-1 - (36/BX) 30 INCH

## (undated) DEVICE — SHAVER4.0 AGGRESSIVE + FORMLA (5EA/BX)

## (undated) DEVICE — LACTATED RINGERS INJ 1000 ML - (14EA/CA 60CA/PF)

## (undated) DEVICE — DRAPE SHOULDER FLUID CONTROL - 77 X 85 (10/CA)

## (undated) DEVICE — PACK SHOULDER ARTHROSCOPY SM - (2EA/CA)